# Patient Record
(demographics unavailable — no encounter records)

---

## 2017-02-06 NOTE — L&D FLOW SHEET
=================================================================

LD Flowsheet

=================================================================

Datetime Report Generated by CPN: 02/06/2017 22:00

   

   

=================================================================

Datetime: 02/06/2017 21:56

=================================================================

   

 Pulse:  118 (QS system process)

 SpO2 (%):  97 (QS system process)

 LaborFlag:  Antepartum (QS system process)

   

=================================================================

Datetime: 02/06/2017 21:54

=================================================================

   

 Pulse:  117 (QS system process)

 SpO2 (%):  94 (QS system process)

 LaborFlag:  Antepartum (QS system process)

   

=================================================================

Datetime: 02/06/2017 21:51

=================================================================

   

 Pulse:  116 (QS system process)

 SpO2 (%):  97 (QS system process)

 LaborFlag:  Antepartum (QS system process)

   

=================================================================

Datetime: 02/06/2017 21:50

=================================================================

   

 NBP Sys/Berta/Mean (mmHg):  123 (QS system process)

:  73 (QS system process)

:  89 (QS system process)

 Pulse:  115 (QS system process)

 LaborFlag:  Antepartum (QS system process)

   

=================================================================

Datetime: 02/06/2017 21:46

=================================================================

   

 Pulse:  114 (QS system process)

 SpO2 (%):  96 (QS system process)

 LaborFlag:  Antepartum (QS system process)

   

=================================================================

Datetime: 02/06/2017 21:44

=================================================================

   

 Pulse:  117 (QS system process)

 SpO2 (%):  94 (QS system process)

 LaborFlag:  Antepartum (QS system process)

   

=================================================================

Datetime: 02/06/2017 21:41

=================================================================

   

 Pulse:  103 (QS system process)

 SpO2 (%):  96 (QS system process)

 LaborFlag:  Antepartum (QS system process)

   

=================================================================

Datetime: 02/06/2017 21:37

=================================================================

   

 NBP Sys/Berta/Mean (mmHg):  125 (QS system process)

:  60 (QS system process)

:  87 (QS system process)

 Pulse:  100 (QS system process)

 Patient Care Comments:  Amnisure collected and sent to lab (Bobbi Irwin RN)

 LaborFlag:  Antepartum (QS system process)

   

=================================================================

Datetime: 02/06/2017 21:36

=================================================================

   

 Tocolytics:  Terbutaline 0.25mg Subcutaneous-Pulse Less than 120

   (Bobbi Irwin RN)

   

=================================================================

Datetime: 02/06/2017 21:30

=================================================================

   

Stage of Pregnancy:  Antepartum (Bobbi Irwin RN)

 Monitor Mode:  External (Bobbi Irwin RN)

 Frequency (min):  2-3 (Bobbi Irwin RN)

 Quality:  Mild/Moderate (Bobbi Irwin RN)

 Duration (sec):  50-90 (Bobbi Irwin RN)

 Resting Tone (Palpate):  Relaxed (Bobbi Irwin RN)

 Monitor Mode:  External US (Bobbi Irwin RN)

 FHR Baseline Rate :  135 (Bobbi Irwin RN)

 FHR Baseline Changes:  No Baseline Change (Bobbi Irwin RN)

 Variability:  Moderate 6-25 bpm (Bobbi Irwin RN)

 Accelerations:  15X15 (Bobbi Irwin RN)

 Decelerations:  None (Bobbi Irwin RN)

 Patient Position/Activity:  Right Tilt; Semi-Fowlers (Bobbi Irwin RN)

 Communication:  RN at Bedside; RN Reviewed Strip (Bobbi Irwin RN)

   

=================================================================

Datetime: 02/06/2017 21:20

=================================================================

   

 NBP Sys/Berta/Mean (mmHg):  117 (QS system process)

:  55 (QS system process)

:  79 (QS system process)

 Pulse:  94 (QS system process)

 LaborFlag:  Antepartum (QS system process)

   

=================================================================

Datetime: 02/06/2017 21:16

=================================================================

   

 Communication Comments:  Dr Mortensen at b/s to per form SVE and discuss

   POC with pt. Pt to receive one dose of Terbutaline, continue to

   monitor closely.  (Bobbi Irwin RN)

   

=================================================================

Datetime: 02/06/2017 21:15

=================================================================

   

 Dilatation (cm):  4.0 (Bobbi Irwin RN)

 Effacement (%):  50 (Bobbi Irwin RN)

 Station:  -3 (Bobbi Irwin RN)

 Exam by:  Dr Mortensen  (Bobbi Irwin RN)

 Cervix, Position:  Midposition (Bobbi Irwin RN)

 Fetal Presentation 'A':  Cephalic (Annotations: ballotable )

   (Bobbi Irwin RN)

 Membrane Comments:  BOW palpable (Bobbi Irwin RN)

   

=================================================================

Datetime: 02/06/2017 21:06

=================================================================

   

 NBP Sys/Berta/Mean (mmHg):  112 (QS system process)

:  54 (QS system process)

:  74 (QS system process)

 Pulse:  92 (QS system process)

 LaborFlag:  Antepartum (QS system process)

   

=================================================================

Datetime: 02/06/2017 21:00

=================================================================

   

Stage of Pregnancy:  Antepartum (Bobbi Irwin RN)

 Respirations:  16 (Bobbi Irwin RN)

 Monitor Mode:  External (Bobbi Irwin RN)

 Frequency (min):  2-4 (Bobbi Irwin RN)

 Quality:  Mild/Moderate (Bobbi Irwni RN)

 Duration (sec):  40-90 (Bobbi Irwin RN)

 Resting Tone (Palpate):  Relaxed (Bobbi Irwin RN)

 Monitor Mode:  External US (Bobbi Irwin RN)

 FHR Baseline Rate :  145 (Bobbi Irwin RN)

 FHR Baseline Changes:  No Baseline Change (Bobbi Irwin RN)

 Variability:  Moderate 6-25 bpm (Bobbi Irwin RN)

 Accelerations:  15X15 (Bobbi Irwin RN)

 Decelerations:  None (Bobbi Irwin RN)

 Pain Scale:  3 (Bobbi Irwin RN)

 Pain Presence:  Intermittent (Bobbi Irwin RN)

 Pain Type:  Contraction (Bobbi Irwin RN)

 Pain Location:  Abdomen (Bobbi Irwin RN)

 Pain Goal:  1 (Bobbi Irwin RN)

 Pain Relief Measures:  Comfort Measures (Bobbi Irwin RN)

 Pain Coping:  Talking Through Contractions (Bobbi Irwin RN)

 Pain Assessment Comments:  Dr Mortensen made aware  (Bobbi Irwin RN)

 Level of Consciousness:  Fully Conscious (Bobbi Irwin RN)

 DTR's/Clonus:  DTRs 2+; No Clonus (Bobbi Irwin RN)

 Headache:  Denies (Bobbi Irwin RN)

 Breath Sounds, Left:  Clear and Equal (Bobbi Irwin RN)

 Breath Sounds, Right:  Clear and Equal (Bobbi Irwin RN)

 Nausea/Vomiting:  Denies (Bobbi Irwin RN)

 RUQ Epigastric Pain:  Denies (Bobbi Irwin RN)

 Magnesium/Antihypertensives:  Magnesium Sulfate IV (Gm/hr) @ 2

   (Bobbi Irwin RN)

 Patient Position/Activity:  Right Tilt; Semi-Fowlers (Bobbi Irwin RN)

 Comfort Measures:  Breathing/Relaxation; Coaching; Family Support

   (Bobbi Irwin RN)

 Communication:  RN at Bedside; RN Reviewed Strip (Bobbi Irwin RN)

 LaborFlag:  Antepartum (QS system process)

   

=================================================================

Datetime: 02/06/2017 20:51

=================================================================

   

 NBP Sys/Berta/Mean (mmHg):  108 (QS system process)

:  54 (QS system process)

:  77 (QS system process)

 Pulse:  88 (QS system process)

 LaborFlag:  Antepartum (QS system process)

   

=================================================================

Datetime: 02/06/2017 20:36

=================================================================

   

 NBP Sys/Berta/Mean (mmHg):  112 (QS system process)

:  55 (QS system process)

:  77 (QS system process)

 Pulse:  88 (QS system process)

 LaborFlag:  Antepartum (QS system process)

   

=================================================================

Datetime: 02/06/2017 20:30

=================================================================

   

Stage of Pregnancy:  Antepartum (Bobbi Irwin RN)

 Monitor Mode:  External (Bobbi Irwin RN)

 Frequency (min):  2-4 (Bobbi Irwin RN)

 Quality:  Mild/Moderate (Bobbi Irwin RN)

 Duration (sec):  50-90 (Bobbi Irwin RN)

 Resting Tone (Palpate):  Relaxed (Bobbi Irwin RN)

 Monitor Mode:  External US (Bobbi Irwin RN)

 Monitor Interventions for FHR:  Ultrasound Adjusted (Bobbi Irwin RN)

 FHR Baseline Rate :  145 (Bobbi Irwin RN)

 FHR Baseline Changes:  No Baseline Change (Bobbi Irwin RN)

 Variability:  Moderate 6-25 bpm (Bobbi Irwin RN)

 Accelerations:  Prolonged (Bobbi Irwin RN)

 Decelerations:  None (Bobbi Irwin RN)

 Pain Presence:  Intermittent (Bobbi Irwin RN)

 Pain Type:  Contraction (Bobbi Irwin RN)

 Pain Location:  Abdomen (Bobbi Irwin RN)

 Pain Relief Measures:  Comfort Measures (Bobbi Irwin RN)

 Pain Coping:  Talking Through Contractions (Bobbi Irwin RN)

 Patient Position/Activity:  Right Tilt; Semi-Fowlers (Bobbi Irwin RN)

 Comfort Measures:  Breathing/Relaxation; Coaching; Family Support

   (Bobbi Irwin RN)

 Communication:  RN at Bedside; RN Reviewed Strip (Bobbi

   Errichiello, RN)

 LaborFlag:  Antepartum (QS system process)

   

=================================================================

Datetime: 02/06/2017 20:25

=================================================================

   

 NBP Sys/Berta/Mean (mmHg):  117 (QS system process)

:  60 (QS system process)

:  82 (QS system process)

 Pulse:  101 (QS system process)

 LaborFlag:  Antepartum (QS system process)

   

=================================================================

Datetime: 02/06/2017 20:15

=================================================================

   

 IV/Blood Work:  IV Started (Bobbi Errichiello, RN)

   

=================================================================

Datetime: 02/06/2017 20:10

=================================================================

   

 I/O Interventions:  Carlson Cath Inserted (Bobbi Irwin RN)

   

=================================================================

Datetime: 02/06/2017 20:05

=================================================================

   

 NBP Sys/Berta/Mean (mmHg):  131 (QS system process)

:  61 (QS system process)

:  88 (QS system process)

 Pulse:  97 (QS system process)

 LaborFlag:  Antepartum (QS system process)

   

=================================================================

Datetime: 02/06/2017 20:00

=================================================================

   

Stage of Pregnancy:  Antepartum (Bobbi Irwin RN)

 Respirations:  16 (Bobbi Irwin RN)

 Temperature (F):  98.2 (Bobbi Irwin RN)

 Temperature (C):  36.8 (QS system process)

 Monitor Mode:  External (Bobbi Irwin RN)

 Monitor Interventions for UA:  [place] Adjusted (Bobbi Irwin RN)

 Frequency (min):  2-4 (Bobbi Irwin RN)

 Quality:  Mild/Moderate (Bobbi Irwin RN)

 Duration (sec):  60-80 (Bobbi Irwin RN)

 Resting Tone (Palpate):  Relaxed (Bobbi Irwin RN)

 Monitor Mode:  External US (Bobbi Irwin RN)

 Monitor Interventions for FHR:  Ultrasound Adjusted (Bobbi Irwin RN)

 FHR Baseline Rate :  135 (Bobbi Irwin RN)

 FHR Baseline Changes:  No Baseline Change (Bobbi Irwin RN)

 Variability:  Moderate 6-25 bpm (Bobbi Irwin RN)

 Accelerations:  15X15 (Bobbi Irwin RN)

 Decelerations:  None (Bobbi Irwin RN)

 Pain Scale:  3 (Bobbi Irwin RN)

 Pain Presence:  Intermittent (Bobbi Irwin RN)

 Pain Type:  Contraction (Bobbi Irwin RN)

 Pain Location:  Abdomen (Bobbi Irwin RN)

 Pain Goal:  1 (Bobbi Irwin RN)

 Pain Relief Measures:  Comfort Measures (Bobbi Irwin RN)

 Pain Coping:  Talking Through Contractions (Bobbi Irwin RN)

 Pain Assessment Comments:  with UCs (Bobbi Irwin RN)

 Membrane Status:  Intact (Bobbi Irwin RN)

 Level of Consciousness:  Fully Conscious (Bobbi Irwin RN)

 DTR's/Clonus:  DTRs 2+; No Clonus (Bobbi Irwin RN)

 Headache:  Denies (Bobbi Irwin RN)

 Breath Sounds, Left:  Clear and Equal (Bobbi Irwin RN)

 Breath Sounds, Right:  Clear and Equal (Bobbi Irwin RN)

 Nausea/Vomiting:  Denies (Bobbi Irwin RN)

 RUQ Epigastric Pain:  Denies (Bobbi Irwin RN)

 Magnesium/Antihypertensives:  Magnesium Sulfate IV (Gm/hr) @ 2

   (Bobbi Irwin RN)

 Patient Position/Activity:  Right Tilt; Semi-Fowlers (Bobbi Irwin RN)

 Comfort Measures:  Breathing/Relaxation; Coaching; Family Support

   (Bobbi Irwin RN)

 Patient Care Comments:  Knee length LITTLE stockings/SCD applied

   (Bobbi Irwin RN)

 Communication:  RN at Bedside; RN Reviewed Strip (Bobbi Irwin RN)

 LaborFlag:  Antepartum (QS system process)

## 2017-02-06 NOTE — L&D FLOW SHEET
=================================================================

LD Flowsheet

=================================================================

Datetime Report Generated by CPN: 02/06/2017 20:00

   

   

=================================================================

Datetime: 02/06/2017 19:50

=================================================================

   

 NBP Sys/Berta/Mean (mmHg):  119 (QS system process)

:  58 (QS system process)

:  84 (QS system process)

 Pulse:  98 (QS system process)

   

=================================================================

Datetime: 02/06/2017 19:35

=================================================================

   

 NBP Sys/Berta/Mean (mmHg):  126 (QS system process)

:  60 (QS system process)

:  86 (QS system process)

 Pulse:  93 (QS system process)

   

=================================================================

Datetime: 02/06/2017 19:30

=================================================================

   

 Communication Comments:  report given to K. Fore, RN. Care

   relinquished (Maris Tovar, RN)

   

=================================================================

Datetime: 02/06/2017 19:22

=================================================================

   

 NBP Sys/Berta/Mean (mmHg):  124 (QS system process)

:  58 (QS system process)

:  84 (QS system process)

 Pulse:  93 (QS system process)

   

=================================================================

Datetime: 02/06/2017 19:15

=================================================================

   

 Monitor Mode:  External (Maris Tovar RN)

 Frequency (min):  3-4 (Maris Tovar RN)

 Quality:  Mild (Maris Tovar RN)

 Duration (sec):  50-60 (Maris Tovar RN)

 Duration Criteria:  Less than Two 120 Second Contractions (Maris Tovar RN)

 Pattern:  Normal: <= 5 Contractions in 10 Minutes (Maris Tovar RN)

 Resting Tone (Palpate):  Relaxed (Maris Tovar RN)

 Monitor Mode:  External US (Maris Tovar RN)

 FHR Baseline Rate :  140 (Maris Tovar RN)

 FHR Baseline Changes:  No Baseline Change (Maris Tovar RN)

 Variability:  Moderate 6-25 bpm (Maris Tovar RN)

 Accelerations:  15X15 (Maris Tovar RN)

 Decelerations:  None (Maris Tovar RN)

 Level of Consciousness:  Fully Conscious (Maris Tovar RN)

 DTR's/Clonus:  DTRs 2+; No Clonus (Maris Tovar RN)

 Headache:  Denies (Maris Tovar RN)

 Breath Sounds, Left:  Clear and Equal (Maris Tovar RN)

 Breath Sounds, Right:  Clear and Equal (Maris Tovar RN)

 Nausea/Vomiting:  Denies (Maris Tovar RN)

 RUQ Epigastric Pain:  Denies (Maris Tovar, VIDYA)

   

=================================================================

Datetime: 02/06/2017 19:06

=================================================================

   

 NBP Sys/Berta/Mean (mmHg):  125 (QS system process)

:  58 (QS system process)

:  83 (QS system process)

 Pulse:  99 (QS system process)

   

=================================================================

Datetime: 02/06/2017 19:00

=================================================================

   

 Monitor Mode:  External (Antonia Sinclair RN)

 Frequency (min):  2-4 (Antonia Sinclair RN)

 Quality:  Mild (Antonia Sinclair, RN)

 Duration (sec):  40-50 (Antonia Sinclair, RN)

 Resting Tone (Palpate):  Relaxed (Antonia Sinclair RN)

 Monitor Mode:  External US (Antonia Sinclair RN)

 FHR Baseline Rate :  135 (Antonia Sinclair, RN)

 Variability:  Moderate 6-25 bpm (Antonia Sinclair, RN)

 Accelerations:  15X15 (Antonia Sinclair, RN)

 Decelerations:  None (Antonia Sinclair, RN)

   

=================================================================

Datetime: 02/06/2017 18:47

=================================================================

   

 NBP Sys/Berta/Mean (mmHg):  111 (QS system process)

:  57 (QS system process)

:  80 (QS system process)

 Pulse:  87 (QS system process)

 Magnesium/Antihypertensives:  Magnesium Sulfate IV (Gm/hr) @ 2 (Maris Tovar RN)

   

=================================================================

Datetime: 02/06/2017 18:46

=================================================================

   

 Communication Comments:  order received from Dr. Mortensen for patient

   to use bedpan for I_O every hour due to patient not wanting hernandez

   catheter (Maris Tovar, RN)

   

=================================================================

Datetime: 02/06/2017 18:45

=================================================================

   

 Monitor Mode:  External (Antonia Yahir, RN)

 Frequency (min):  2-4 (Antoniaanette Sinclair, RN)

 Quality:  Mild (Antoniaanette Sinclair, RN)

 Duration (sec):  30-50 (Antoniaanette Sinclair, RN)

 Resting Tone (Palpate):  Relaxed (Antonia Sinclair, RN)

 Monitor Mode:  External US (Antonia Sniclair, RN)

 FHR Baseline Rate :  135 (Antoniaanette Sinclair, RN)

 Variability:  Moderate 6-25 bpm (Antonia Sinclair, RN)

 Accelerations:  15X15 (Antoniaanette Sinclair, RN)

 Decelerations:  None (Antonia Yahir, RN)

   

=================================================================

Datetime: 02/06/2017 18:42

=================================================================

   

 NBP Sys/Berta/Mean (mmHg):  115 (QS system process)

:  57 (QS system process)

:  78 (QS system process)

 Pulse:  88 (QS system process)

   

=================================================================

Datetime: 02/06/2017 18:37

=================================================================

   

 NBP Sys/Berta/Mean (mmHg):  118 (QS system process)

:  62 (QS system process)

:  81 (QS system process)

 Pulse:  90 (QS system process)

   

=================================================================

Datetime: 02/06/2017 18:33

=================================================================

   

 NBP Sys/Berta/Mean (mmHg):  113 (QS system process)

:  63 (QS system process)

:  79 (QS system process)

 Pulse:  100 (QS system process)

   

=================================================================

Datetime: 02/06/2017 18:32

=================================================================

   

 NBP Sys/Berta/Mean (mmHg):  123 (QS system process)

:  57 (QS system process)

:  82 (QS system process)

 Pulse:  94 (QS system process)

   

=================================================================

Datetime: 02/06/2017 18:30

=================================================================

   

 Monitor Mode:  External (Antonia Sinclair RN)

 Frequency (min):  2-4 (Antonia Sinclair RN)

 Quality:  Mild (Antonia Sinclair RN)

 Duration (sec):  30-50 (Antonia Sinclair RN)

 Resting Tone (Palpate):  Relaxed (Antonia Sinclair RN)

 Monitor Mode:  External US (Antonia Sinclair RN)

 FHR Baseline Rate :  135 (Antonia Sinclair RN)

 Variability:  Moderate 6-25 bpm (Antonia Sinclair RN)

 Accelerations:  10X10 (Antonia Sinclair RN)

 Decelerations:  None (Antonia Sinclair RN)

 Level of Consciousness:  Fully Conscious (Maris Tovar RN)

 DTR's/Clonus:  DTRs 2+; No Clonus (Maris Tovar RN)

 Headache:  Denies (Maris Tovar RN)

 Breath Sounds, Left:  Clear and Equal (Maris Tovar RN)

 Breath Sounds, Right:  Clear and Equal (Maris Tovar RN)

 Nausea/Vomiting:  Denies (Maris Tovar RN)

 RUQ Epigastric Pain:  Denies (Maris Tovar RN)

   

=================================================================

Datetime: 02/06/2017 18:28

=================================================================

   

 NBP Sys/Berta/Mean (mmHg):  127 (QS system process)

:  59 (QS system process)

:  85 (QS system process)

 Pulse:  88 (QS system process)

   

=================================================================

Datetime: 02/06/2017 18:22

=================================================================

   

 NBP Sys/Berta/Mean (mmHg):  126 (QS system process)

:  63 (QS system process)

:  88 (QS system process)

 Pulse:  90 (QS system process)

   

=================================================================

Datetime: 02/06/2017 18:15

=================================================================

   

 Monitor Mode:  External (Antonia Sinclair RN)

 Frequency (min):  2-4 (Antonia Sinclair, RN)

 Quality:  Mild (Antonia Sinclair RN)

 Duration (sec):  30-50 (Antonia Sinclair RN)

 Resting Tone (Palpate):  Relaxed (Antonia Sinclair RN)

 Monitor Mode:  External US (Antonia Sinclair RN)

 FHR Baseline Rate :  135 (Antonia Sinclair RN)

 Variability:  Moderate 6-25 bpm (Antonia Sinclair RN)

 Accelerations:  15X15 (Antonia Sinclair RN)

 Decelerations:  None (Antonia Sinclair RN)

 Level of Consciousness:  Fully Conscious (Maris Tovar RN)

 DTR's/Clonus:  DTRs 2+; No Clonus (Maris Tovar RN)

 Headache:  Denies (Maris Tovar RN)

 Breath Sounds, Left:  Clear and Equal (Maris Tovar RN)

 Breath Sounds, Right:  Clear and Equal (Maris Tovar RN)

 Nausea/Vomiting:  Denies (Maris Tovar RN)

 RUQ Epigastric Pain:  Denies (Maris Tovar RN)

 Magnesium/Antihypertensives:  Magnesium Sulfate IV Loading (Gm) @ 4

   (Maris Tovar RN)

   

=================================================================

Datetime: 02/06/2017 18:14

=================================================================

   

 Steroids:  Dexamethasone (mg) @ 12 (Maris Tovar RN)

 Medication Comments:  right gluteus IM (Maris Tovar RN)

   

=================================================================

Datetime: 02/06/2017 18:10

=================================================================

   

 Antibiotics:  Penicillin IV (Units) @ [id number] (Maris Tovar RN)

   

=================================================================

Datetime: 02/06/2017 18:00

=================================================================

   

 Monitor Mode:  External (Antonia Sinclair RN)

 Frequency (min):  2-4 (Antonia Sinclair RN)

 Quality:  Mild (Antonia Sinclair RN)

 Duration (sec):  30-60 (Antonia Sinclair RN)

 Resting Tone (Palpate):  Relaxed (Antonia Sinclair RN)

 Monitor Mode:  External US (Antonia Sinclair RN)

 FHR Baseline Rate :  135 (Antonia Sinclair RN)

 Variability:  Moderate 6-25 bpm (Antonia Sinclair RN)

 Accelerations:  10X10 (Antonia Sinclair RN)

 Decelerations:  None (Antonia Sinclair RN)

## 2017-02-06 NOTE — L&D PROGRESS NOTES
=================================================================

PROGRESS NOTES

=================================================================

Datetime Report Generated by CPN: 2017 21:28

   

   

=================================================================

PROGRESS NOTE

=================================================================

   

Impression:  Normal Progression of Labor

Procedures:  Artificial ROM

Plan:  Continue Present Management; Tocolysis

Informed Consent Obtained:  Vaginal Delivery;  Section

   Delivery; Risks, Benefits and Alternatives Discussed

Informed Consent Obtained:  Vaginal Delivery; Risks, Benefits and

   Alternatives Discussed

Vital Signs :  Reviewed

Comment:  32+0ega here for  labor.  Cervix on exam slightly more

   dilated than my previous exam.  Will give one dose of terbutaline to

   see if it will help lessen the ctx.  She is now reporting the ctx

   are more painful and baby is ballottable.   Will continue to

   monitor.  Deliver for unstoppable  labor.  

   

=================================================================

VAGINAL EXAM

=================================================================

   

Dilatation:  4

Dilatation:  4

Effacement:  50

Effacement:  50

Station:  -3

Station:  -3

Contractions:  q 2-3

Contractions:  q 2-3 minutes

   

=================================================================

MEMBRANES

=================================================================

   

Membranes:  Intact

   

=================================================================

FETUS A

=================================================================

   

FHR - Baseline:  135

Monitoring:  External US

Variability:  Moderate 6-25bpm

Accelerations:  15X15

Decelerations:  None

FHR Category:  Category I

Fetal Presentation:  Vertex

   

=================================================================

SIGNATURE

=================================================================

   

SIGNATURE:  [id number]

Signature:  Electronically signed by Eugenie Mortensen MD ([facility]) on

   2017 at 21:27  with User ID: [email or web address]

## 2017-02-06 NOTE — L&D FLOW SHEET
=================================================================

LD Flowsheet

=================================================================

Datetime Report Generated by CPN: 02/06/2017 18:00

   

   

=================================================================

Datetime: 02/06/2017 17:49

=================================================================

   

 NBP Sys/Berta/Mean (mmHg):  131 (QS system process)

:  68 (QS system process)

:  92 (QS system process)

 Pulse:  87 (QS system process)

   

=================================================================

Datetime: 02/06/2017 17:39

=================================================================

   

 Communication Comments:  orders received from Dr. Mortensen for

   Penicillin 5,000,000 units now, then Penicillin 2,500,000 units

   every 4 hours, Betamethasone 12 mg IV x1 now and Betamethasone 12 mg

   IV 24 hours after second dose, admit patient and do admission labs,

   Start magnesium 4 gram loading dose bolus, then Magnesium 2

   gram/hour continuously (Maris Tovar RN)

   

=================================================================

Datetime: 02/06/2017 17:37

=================================================================

   

 Dilatation (cm):  3.5 (Maris Tovar RN)

 Effacement (%):  50 (Maris Tovar RN)

 Station:  -3 (Maris Tovar RN)

 Exam by:  Dr. Mortensen (Maris Tovar RN)

 Cervix, Consistency:  Moderate (Maris Tovar RN)

 Cervix, Position:  Posterior (Maris Tovar RN)

   

=================================================================

Datetime: 02/06/2017 17:36

=================================================================

   

 Communication:  Provider at Bedside (Maris Tovar RN)

 Communication Comments:  Dr. Mortensen at patient's bedside assessing

   patient (Maris Tovar RN)

   

=================================================================

Datetime: 02/06/2017 17:33

=================================================================

   

 NBP Sys/Berta/Mean (mmHg):  109 (QS system process)

:  62 (QS system process)

:  79 (QS system process)

 Pulse:  86 (QS system process)

   

=================================================================

Datetime: 02/06/2017 17:31

=================================================================

   

 Communication Comments:  blood glucose 75 (Maris Guy, RN)

   

=================================================================

Datetime: 02/06/2017 17:27

=================================================================

   

 Communication Comments:  order received from PEllen Boyd, CNM for blood

   sugar check (Maris Guy, RN)

   

=================================================================

Datetime: 02/06/2017 17:26

=================================================================

   

 Bedside Blood Glucose:  75 (QS system process)

   

=================================================================

Datetime: 02/06/2017 17:22

=================================================================

   

 Comments:  P. Boyd CNM notified of patient's cervical length and UA

   results. Provider reviewing strip. No new orders received (Maris Tovar, RN)

   

=================================================================

Datetime: 02/06/2017 17:18

=================================================================

   

 NBP Sys/Berta/Mean (mmHg):  111 (QS system process)

:  57 (QS system process)

:  79 (QS system process)

 Pulse:  77 (QS system process)

   

=================================================================

Datetime: 02/06/2017 17:03

=================================================================

   

 NBP Sys/Berta/Mean (mmHg):  113 (QS system process)

:  70 (QS system process)

:  84 (QS system process)

 Pulse:  91 (QS system process)

   

=================================================================

Datetime: 02/06/2017 17:00

=================================================================

   

 Monitor Mode:  External; Palpation (Maris Tovar, VIDYA)

 Frequency (min):  x1 (Maris Tovar, VIDYA)

 Quality:  Mild (Maris Tovar, VIDYA)

 Duration (sec):  40 (Maris Tovar, VIDYA)

 Duration Criteria:  Less than Two 120 Second Contractions (Maris Tovar, RN)

 Pattern:  Normal: <= 5 Contractions in 10 Minutes (Maris Tovar, RN)

 Resting Tone (Palpate):  Relaxed (Maris Tovar, RN)

 Monitor Mode:  External US (Maris Tovar, RN)

 FHR Baseline Rate :  140 (Maris Tovar RN)

 FHR Baseline Changes:  No Baseline Change (Maris Tovar, VIDYA)

 Variability:  Moderate 6-25 bpm (Maris Tovar, RN)

 Accelerations:  None (Maris Tovar, RN)

 Decelerations:  None (Maris Tovar, RN)

   

=================================================================

Datetime: 02/06/2017 16:49

=================================================================

   

 NBP Sys/Berta/Mean (mmHg):  115 (QS system process)

:  62 (QS system process)

:  83 (QS system process)

 Pulse:  83 (QS system process)

   

=================================================================

Datetime: 02/06/2017 16:46

=================================================================

   

 Monitor Interventions for UA:  [place] Adjusted (Maris Guy, RN)

   

=================================================================

Datetime: 02/06/2017 16:42

=================================================================

   

 Monitor Interventions for FHR:  Ultrasound Adjusted (Maris Tovar,

   RN)

 Patient Position/Activity:  Right Tilt; Semi-Fowlers (Maris Guy,

   RN)

   

=================================================================

Datetime: 02/06/2017 16:18

=================================================================

   

 Communication Comments:  monitors removed from patient for patient's

   transport to [place] (Maris Tovar RN)

   

=================================================================

Datetime: 02/06/2017 16:11

=================================================================

   

 NBP Sys/Berta/Mean (mmHg):  117 (QS system process)

:  56 (QS system process)

:  79 (QS system process)

 Pulse:  83 (QS system process)

   

=================================================================

Datetime: 02/06/2017 16:00

=================================================================

   

 Monitor Mode:  External; Palpation (Maris Tovar RN)

 Frequency (min):  2.5-4 (Maris Tovar RN)

 Quality:  Mild (Maris Tovar RN)

 Duration (sec):  50-60 (Maris Tovar RN)

 Duration Criteria:  Less than Two 120 Second Contractions (Maris Tovar RN)

 Pattern:  Normal: <= 5 Contractions in 10 Minutes (Maris Tovar RN)

 Resting Tone (Palpate):  Relaxed (Maris Tovar RN)

 Monitor Mode:  External US (Maris Tovar RN)

 FHR Baseline Rate :  140 (Maris Tovar RN)

 FHR Baseline Changes:  No Baseline Change (Maris Tovar RN)

 Variability:  Moderate 6-25 bpm (Maris Tovar RN)

 Accelerations:  10X10 (Maris Tovar RN)

 Decelerations:  None (Maris Tovar RN)

## 2017-02-06 NOTE — L&D FLOW SHEET
=================================================================

LD Flowsheet

=================================================================

Datetime Report Generated by CPN: 02/06/2017 16:00

   

   

=================================================================

Datetime: 02/06/2017 15:56

=================================================================

   

   

=================================================================

Vital Signs

=================================================================

   

 NBP Sys/Berta/Mean (mmHg):  126 (QS system process)

:  61 (QS system process)

:  85 (QS system process)

 Pulse:  83 (QS system process)

   

=================================================================

Datetime: 02/06/2017 15:41

=================================================================

   

   

=================================================================

Vital Signs

=================================================================

   

 NBP Sys/Berta/Mean (mmHg):  112 (QS system process)

:  62 (QS system process)

:  81 (QS system process)

 Pulse:  92 (QS system process)

   

=================================================================

Datetime: 02/06/2017 15:29

=================================================================

   

   

=================================================================

Vital Signs

=================================================================

   

 NBP Sys/Berta/Mean (mmHg):  109 (QS system process)

:  63 (QS system process)

:  78 (QS system process)

 Pulse:  96 (QS system process)

   

=================================================================

Datetime: 02/06/2017 15:26

=================================================================

   

   

=================================================================

Patient Care

=================================================================

   

 IV/Blood Work:  IV Started; IV Bolus Started (Maris Guy, RN)

   

=================================================================

Datetime: 02/06/2017 15:18

=================================================================

   

   

=================================================================

Uterine Activity

=================================================================

   

 Monitor Interventions for UA:  [place] Adjusted (Maris Tovar, RN)

 Resting Tone (Palpate):  Relaxed (Maris Tovar, RN)

   

=================================================================

Fetal Assessment A

=================================================================

   

 Monitor Mode:  External US (Maris Tovar, RN)

   

=================================================================

Pain

=================================================================

   

 Pain Scale:  2 (Annotations: patient states she has no pain except for

   during contractions, then pain is 2/5. Patient states she does not

   know how far apart contractions are) (Maris Tovar RN)

 Pain Presence:  Intermittent (Maris Tovar RN)

 Pain Type:  Cramping (Maris Tovar RN)

 Pain Location:  Back (Maris Tovar RN)

 Pain Goal:  1 (Maris Tovar RN)

 Pain Relief Measures:  Comfort Measures (Maris Tovar RN)

   

=================================================================

Vaginal Exam

=================================================================

   

 Membrane Status:  Intact (Annotations: intact per patient) (Maris Tovar RN)

 Vaginal Bleeding:  Scant (Annotations: patient states she has had

   bright red spotting since 2030 2-5-17) (Maris Tovar RN)

   

=================================================================

Maternal Assessment

=================================================================

   

 Level of Consciousness:  Fully Conscious (Maris Tovar RN)

 DTR's/Clonus:  DTRs 2+; No Clonus (Maris Tovar RN)

 Headache:  Denies (Maris Tovar RN)

 Breath Sounds, Left:  Clear and Equal (Maris Tovar RN)

 Breath Sounds, Right:  Clear and Equal (Maris Tovar RN)

 Nausea/Vomiting:  Denies (Maris Tovar RN)

 RUQ Epigastric Pain:  Denies (Maris Tovar RN)

 Oxygen Method:  Room Air (Maris Tovar RN)

 Patient Position/Activity:  Right Tilt; Semi-Fowlers (Maris Tovar RN)

 I/O Interventions:  Clear Liquids Given (Maris Tovar RN)

 Provider Reviewed Strip:  Yes (Maris Tovar RN)

   

=================================================================

Teaching

=================================================================

   

 Instructional Method:  Verbal (Maris Tovar RN)

 Plan of Care:  Plan of Care Discussed (Maris Tovar RN)

 Unit Routine:  [place] to Room; Call New; Bed; Visiting Policy;

   Waiting Areas (Maris Tovar RN)

 Pregnancy Related:  Common Discomforts of Pregnancy (Maris Tovar RN)

## 2017-02-07 NOTE — L&D PROGRESS NOTES
=================================================================

PROGRESS NOTES

=================================================================

Datetime Report Generated by CPN: 2017 18:55

   

   

=================================================================

PROGRESS NOTE

=================================================================

   

Impression:   Labor

Plan:  Continue Present Management

Comment:  cont steroids for flm, gbs prophylaxis and tocolysis

   

=================================================================

FETUS A

=================================================================

   

Monitoring:  External US

FHR Category:  Category I

   

=================================================================

FETUS C

=================================================================

   

SIGNATURE:  [id number]

Signature:  Electronically signed by MD TENISHA Roland) on

   2017 at 18:54  with User ID: [email or web address]

## 2017-02-07 NOTE — L&D FLOW SHEET
=================================================================

LD Flowsheet

=================================================================

Datetime Report Generated by CPN: 02/07/2017 10:00

   

   

=================================================================

Datetime: 02/07/2017 09:51

=================================================================

   

 NBP Sys/Berta/Mean (mmHg):  120 (QS system process)

:  70 (QS system process)

:  89 (QS system process)

 Pulse:  100 (QS system process)

 LaborFlag:  Antepartum (QS system process)

   

=================================================================

Datetime: 02/07/2017 09:45

=================================================================

   

 Monitor Mode:  External; Palpation (Maris Tovar RN)

 Frequency (min):  x2 (Maris Tovar RN)

 Quality:  Mild (Maris Tovar RN)

 Duration (sec):  60 (Maris Tovar RN)

 Duration Criteria:  Less than Two 120 Second Contractions (Maris Tovar RN)

 Pattern:  Normal: <= 5 Contractions in 10 Minutes (Maris Tovar RN)

 Resting Tone (Palpate):  Relaxed (Maris Tovar RN)

 Monitor Mode:  External US (Maris Tovar RN)

 FHR Baseline Rate :  130 (Maris Tovar RN)

 FHR Baseline Changes:  No Baseline Change (Maris Tovar RN)

 Variability:  Moderate 6-25 bpm (Maris Tovar RN)

 Accelerations:  None (Maris Tovar, RN)

 Decelerations:  None (Maris Tovar RN)

 Communication Comments:  no pooling, no visible leaking (Maris Tovar RN)

 Communication Comments:  amnisure negative (Maris Tovar RN)

   

=================================================================

Datetime: 02/07/2017 09:31

=================================================================

   

 Respirations:  14 (Maris Tovar RN)

 Temperature (F):  98.1 (Maris Tovar RN)

 Temperature (C):  36.7 (QS system process)

 Temperature Route:  Oral (Maris Tovar RN)

 LaborFlag:  Antepartum (QS system process)

   

=================================================================

Datetime: 02/07/2017 09:30

=================================================================

   

 Monitor Mode:  External; Palpation (Maris Tovar, RN)

 Frequency (min):  x2 (Maris Tovar, RN)

 Quality:  Mild (Maris Guy, RN)

 Duration (sec):  50-60 (Maris Guy, RN)

 Duration Criteria:  Less than Two 120 Second Contractions (Maris

   Guy, RN)

 Pattern:  Normal: <= 5 Contractions in 10 Minutes (Maris Tovar, RN)

 Resting Tone (Palpate):  Relaxed (Maris Tovar, RN)

 Monitor Mode:  External US (Maris Tovar, RN)

 FHR Baseline Rate :  130 (Maris Tovar, RN)

 FHR Baseline Changes:  No Baseline Change (Maris Tovar, RN)

 Variability:  Moderate 6-25 bpm (Maris Guy, RN)

 Accelerations:  15X15 (Maris Guy, RN)

 Decelerations:  None (Maris Guy, RN)

   

=================================================================

Datetime: 02/07/2017 09:27

=================================================================

   

 Patient Position/Activity:  Right Tilt (Maris Tovar, RN)

 Communication Comments:  patient complains of hernandez catheter leaking,

   no visual urine leaking, amnisure collected and sent (Maris Tovar, RN)

   

=================================================================

Datetime: 02/07/2017 09:21

=================================================================

   

 NBP Sys/Berta/Mean (mmHg):  116 (QS system process)

:  57 (QS system process)

:  78 (QS system process)

 Pulse:  95 (QS system process)

 LaborFlag:  Antepartum (QS system process)

   

=================================================================

Datetime: 02/07/2017 09:15

=================================================================

   

 Monitor Mode:  External; Palpation (Maris Tovar RN)

 Frequency (min):  2-8 (Maris Tovar, VIDYA)

 Quality:  Mild (Maris Tovar, RN)

 Duration (sec):   (Maris Tovar, RN)

 Duration Criteria:  Less than Two 120 Second Contractions (Maris Tovar, RN)

 Pattern:  Normal: <= 5 Contractions in 10 Minutes (Maris Tovar, RN)

 Resting Tone (Palpate):  Relaxed (Maris Tovar, RN)

 Monitor Mode:  External US (Maris Tovar, RN)

 FHR Baseline Rate :  125 (Maris Tovar, RN)

 FHR Baseline Changes:  No Baseline Change (Maris Tovar, RN)

 Variability:  Moderate 6-25 bpm (Maris Tovar, RN)

 Accelerations:  15X15 (Maris Tovar, RN)

 Decelerations:  None (Maris Tovar, RN)

   

=================================================================

Datetime: 02/07/2017 09:00

=================================================================

   

 Monitor Mode:  External; Palpation (Maris Tovar RN)

 Frequency (min):  5.5-7 (Maris Tovar RN)

 Quality:  Mild (Maris Tovar, RN)

 Duration (sec):  50-60 (Maris Tovar, VIDYA)

 Duration Criteria:  Less than Two 120 Second Contractions (Maris Tovar RN)

 Pattern:  Normal: <= 5 Contractions in 10 Minutes (Maris Tovar RN)

 Resting Tone (Palpate):  Relaxed (Maris Tovar, RN)

 Monitor Mode:  External US (Maris Tovar, RN)

 FHR Baseline Rate :  125 (Maris Tovar RN)

 FHR Baseline Changes:  No Baseline Change (Maris Tovar RN)

 Variability:  Moderate 6-25 bpm (Maris Tovar, RN)

 Accelerations:  15X15 (Maris Tovar, RN)

 Decelerations:  None (Maris Tovar RN)

 Level of Consciousness:  Fully Conscious (Maris Tovar, RN)

 DTR's/Clonus:  DTRs 2+; No Clonus (Maris Tovar, RN)

 Headache:  Denies (Maris Tovar, RN)

 Breath Sounds, Left:  Clear and Equal (Maris Tovar, RN)

 Breath Sounds, Right:  Clear and Equal (Maris Tovar, RN)

 Nausea/Vomiting:  Denies (Maris Tovar, RN)

 RUQ Epigastric Pain:  Denies (Maris Tovar, RN)

   

=================================================================

Datetime: 02/07/2017 08:51

=================================================================

   

 NBP Sys/Berta/Mean (mmHg):  101 (QS system process)

:  57 (QS system process)

:  72 (QS system process)

 Pulse:  97 (QS system process)

 LaborFlag:  Antepartum (QS system process)

   

=================================================================

Datetime: 02/07/2017 08:45

=================================================================

   

 Monitor Mode:  External; Palpation (Maris Tovar RN)

 Frequency (min):  x1 (Maris Tovar, VIDYA)

 Quality:  Mild (Maris Tovar, RN)

 Duration (sec):  80 (Maris Tovar, RN)

 Duration Criteria:  Less than Two 120 Second Contractions (Maris Tovar RN)

 Pattern:  Normal: <= 5 Contractions in 10 Minutes (Maris Tovar RN)

 Resting Tone (Palpate):  Relaxed (Maris Tovar, RN)

 Monitor Mode:  External US (Maris Tovar RN)

 FHR Baseline Changes:  No Baseline Change (Maris Tovar RN)

 Variability:  Moderate 6-25 bpm (Maris Tovar, RN)

 Accelerations:  10X10 (Maris Tovar, RN)

 Decelerations:  None (Maris Tovar, RN)

   

=================================================================

Datetime: 02/07/2017 08:43

=================================================================

   

 Antibiotics:  Penicillin IV (Units) @ 2,500,000 (Maris Tovar RN)

   

=================================================================

Datetime: 02/07/2017 08:30

=================================================================

   

 Monitor Mode:  External; Palpation (Maris Tovar, VIDYA)

 Frequency (min):  x2 (Maris Tovar, VIDYA)

 Quality:  Mild (Maris Tovar, VIDYA)

 Duration (sec):  60-70 (Maris Tovar, RN)

 Duration Criteria:  Less than Two 120 Second Contractions (Maris Tovar, RN)

 Pattern:  Normal: <= 5 Contractions in 10 Minutes (Maris Tovar, RN)

 Resting Tone (Palpate):  Relaxed (Maris Tovar, RN)

 Monitor Mode:  External US (Maris Tovar, RN)

 FHR Baseline Rate :  130 (Maris Tovar, RN)

 FHR Baseline Changes:  No Baseline Change (Maris Tovar, RN)

 Variability:  Moderate 6-25 bpm (Maris Tovar, RN)

 Accelerations:  10X10 (Maris Tovar, RN)

 Decelerations:  None (Maris Tovar, RN)

   

=================================================================

Datetime: 02/07/2017 08:21

=================================================================

   

 NBP Sys/Berta/Mean (mmHg):  107 (QS system process)

:  56 (QS system process)

:  77 (QS system process)

 Pulse:  83 (QS system process)

 LaborFlag:  Antepartum (QS system process)

   

=================================================================

Datetime: 02/07/2017 08:15

=================================================================

   

 Monitor Mode:  External; Palpation (Maris Guy, RN)

 Frequency (min):  x1 (Maris Guy, RN)

 Quality:  Mild (Maris Guy, RN)

 Duration (sec):  60 (Maris Guy, RN)

 Duration Criteria:  Less than Two 120 Second Contractions (Maris

   Guy, RN)

 Pattern:  Normal: <= 5 Contractions in 10 Minutes (Maris Guy, RN)

 Resting Tone (Palpate):  Relaxed (Maris Guy, RN)

 Monitor Mode:  External US (Maris Guy, RN)

 FHR Baseline Rate :  130 (Maris Guy, RN)

 FHR Baseline Changes:  No Baseline Change (Maris Guy, RN)

 Variability:  Moderate 6-25 bpm (Maris Guy, RN)

 Accelerations:  10X10 (Maris Guy, RN)

 Decelerations:  None (Maris Guy, RN)

   

=================================================================

Datetime: 02/07/2017 08:00

=================================================================

   

 Monitor Mode:  External; Palpation (Maris Guy, RN)

 Frequency (min):  x1 (Maris Guy, RN)

 Quality:  Mild (Maris Guy, RN)

 Duration (sec):  60 (Maris Guy, RN)

 Duration Criteria:  Less than Two 120 Second Contractions (Maris Tovar RN)

 Pattern:  Normal: <= 5 Contractions in 10 Minutes (Maris Tovar RN)

 Resting Tone (Palpate):  Relaxed (Maris Tovar RN)

 Monitor Mode:  External US (Maris Tovar RN)

 FHR Baseline Rate :  130 (Maris Tovar RN)

 FHR Baseline Changes:  No Baseline Change (Maris Tovar RN)

 Variability:  Moderate 6-25 bpm (Maris Tovar RN)

 Accelerations:  10X10 (Maris Tovar RN)

 Decelerations:  None (Maris Tovar RN)

 Level of Consciousness:  Fully Conscious (Maris Tovar RN)

 DTR's/Clonus:  DTRs 2+; No Clonus (Maris Tovar RN)

 Headache:  Denies (Marsi Tovar RN)

 Breath Sounds, Left:  Clear and Equal (Maris Tovar RN)

 Breath Sounds, Right:  Clear and Equal (Maris Tovar RN)

 Nausea/Vomiting:  Denies (Maris Tovar RN)

 RUQ Epigastric Pain:  Denies (Maris Tovar RN)

## 2017-02-07 NOTE — L&D FLOW SHEET
=================================================================

LD Flowsheet

=================================================================

Datetime Report Generated by CPN: 02/07/2017 22:00

   

   

=================================================================

Datetime: 02/07/2017 20:59

=================================================================

   

 NBP Sys/Berta/Mean (mmHg):  111 (QS system process)

:  61 (QS system process)

:  81 (QS system process)

 Pulse:  102 (QS system process)

 LaborFlag:  Antepartum (QS system process)

   

=================================================================

Datetime: 02/07/2017 20:51

=================================================================

   

 NBP Sys/Berta/Mean (mmHg):  108 (QS system process)

:  58 (QS system process)

:  78 (QS system process)

 Pulse:  94 (QS system process)

 LaborFlag:  Antepartum (QS system process)

   

=================================================================

Datetime: 02/07/2017 20:21

=================================================================

   

 NBP Sys/Berta/Mean (mmHg):  108 (QS system process)

:  53 (QS system process)

:  76 (QS system process)

 Pulse:  107 (QS system process)

 LaborFlag:  Antepartum (QS system process)

   

=================================================================

Datetime: 02/07/2017 20:00

=================================================================

   

 Pain Presence:  None/Denies (Rere Fox, RN)

 Level of Consciousness:  Fully Conscious (Rere Fox, RN)

 DTR's/Clonus:  DTRs 2+; No Clonus (Rere Fox, RN)

 Headache:  Denies (Rere Fox RN)

 Breath Sounds, Left:  Clear and Equal (Rere Dominique, RN)

 Breath Sounds, Right:  Clear and Equal (Rere Dominique, RN)

 Nausea/Vomiting:  Denies (Rere Fox RN)

 RUQ Epigastric Pain:  Denies (Rere Fox RN)

 Magnesium/Antihypertensives:  Magnesium Sulfate IV (Gm/hr) @

   (Annotations: 2) (Rere Fox RN)

 Patient Position/Activity:  Left Lateral (Rere Fox RN)

 Comfort Measures:  Family Support (Rere Fox RN)

 Instructional Method:  Verbal; Patient Instructed; Family/Support

   Person Instructed; Verbalized Understanding (Rere Dominique, RN)

 Plan of Care:  Plan of Care Discussed (Rere Fox RN)

 Unit Routine:  Fetal Monitoring (Annotations: Pt instructed that

   intermittent fetal monitoring was ordered. Pt and support person

   understand she is to report any increase in ctx/ pain so that fetal

   monitoring can be restarted. ) (Rere Fox RN)

 LaborFlag:  Antepartum (QS system process)

## 2017-02-07 NOTE — L&D FLOW SHEET
=================================================================

LD Flowsheet

=================================================================

Datetime Report Generated by CPN: 02/07/2017 18:00

   

   

=================================================================

Datetime: 02/07/2017 17:51

=================================================================

   

 NBP Sys/Berta/Mean (mmHg):  117 (QS system process)

:  55 (QS system process)

:  75 (QS system process)

 Pulse:  98 (QS system process)

 LaborFlag:  Antepartum (QS system process)

   

=================================================================

Datetime: 02/07/2017 17:30

=================================================================

   

 Monitor Mode:  External; Palpation (Maris Tovar RN)

 Quality:  Mild (Maris Guy, RN)

 Duration Criteria:  Less than Two 120 Second Contractions (Maris Tovar, RN)

 Pattern:  Normal: <= 5 Contractions in 10 Minutes (Maris Tovar, RN)

 Resting Tone (Palpate):  Relaxed (Maris Tovar, RN)

 Contraction Comments:  irritability (Maris Tovar RN)

 Monitor Mode:  External US (Maris Tovar RN)

 FHR Baseline Rate :  130 (Maris Tovar, RN)

 FHR Baseline Changes:  No Baseline Change (Maris Tovar, RN)

 Variability:  Marked >25 bpm (Maris Tovar, RN)

 Accelerations:  15X15 (Maris Tovar, RN)

 Decelerations:  None (Maris Tovar, RN)

   

=================================================================

Datetime: 02/07/2017 17:21

=================================================================

   

 NBP Sys/Berta/Mean (mmHg):  113 (QS system process)

:  55 (QS system process)

:  74 (QS system process)

 Pulse:  96 (QS system process)

 LaborFlag:  Antepartum (QS system process)

   

=================================================================

Datetime: 02/07/2017 17:15

=================================================================

   

 Monitor Mode:  External; Palpation (Maris Tovar, VIDYA)

 Quality:  Mild (Maris Tovar RN)

 Duration Criteria:  Less than Two 120 Second Contractions (Maris Tovar RN)

 Pattern:  Normal: <= 5 Contractions in 10 Minutes (Maris Tovar, RN)

 Resting Tone (Palpate):  Relaxed (Maris Tovar RN)

 Contraction Comments:  irritability (Maris Tovar RN)

 Monitor Mode:  External US (Maris Tovar, RN)

 FHR Baseline Rate :  140 (Maris Tovar, RN)

 FHR Baseline Changes:  No Baseline Change (Maris Tovar, RN)

 Variability:  Moderate 6-25 bpm (Maris Tovar, RN)

 Accelerations:  15X15 (Maris Tovar, RN)

 Decelerations:  None (Maris Tovar, RN)

   

=================================================================

Datetime: 02/07/2017 17:00

=================================================================

   

 Monitor Mode:  External; Palpation (Maris Tovar RN)

 Quality:  Mild (Maris Tovar RN)

 Duration Criteria:  Less than Two 120 Second Contractions (Maris Tovar RN)

 Pattern:  Normal: <= 5 Contractions in 10 Minutes (Maris Tovar, RN)

 Resting Tone (Palpate):  Relaxed (Maris Tovar RN)

 Contraction Comments:  irritability (Maris Tovar RN)

 Monitor Mode:  External US (Maris Tovar RN)

 FHR Baseline Rate :  145 (Maris Tovar, RN)

 FHR Baseline Changes:  No Baseline Change (Maris Tovar, RN)

 Variability:  Moderate 6-25 bpm (Maris Tovar, RN)

 Accelerations:  15X15 (Maris Tovar, RN)

 Decelerations:  None (Maris Tovar, VIDYA)

 Magnesium/Antihypertensives:  Magnesium Sulfate IV (Gm/hr) @

   (Annotations: 2) (Maris Tovar, RN)

   

=================================================================

Datetime: 02/07/2017 16:51

=================================================================

   

 NBP Sys/Berta/Mean (mmHg):  108 (QS system process)

:  54 (QS system process)

:  78 (QS system process)

 Pulse:  87 (QS system process)

 LaborFlag:  Antepartum (QS system process)

   

=================================================================

Datetime: 02/07/2017 16:45

=================================================================

   

 Monitor Mode:  External; Palpation (Maris Tovar RN)

 Quality:  Mild/Moderate (Maris Tovar RN)

 Duration Criteria:  Less than Two 120 Second Contractions (Maris Tovar RN)

 Pattern:  Normal: <= 5 Contractions in 10 Minutes (Maris Tovar RN)

 Resting Tone (Palpate):  Relaxed (Maris Tovar RN)

 Contraction Comments:  irritability (Maris Tovar RN)

 Monitor Mode:  External US (Mairs Tovar RN)

 FHR Baseline Rate :  145 (Maris Tovar RN)

 FHR Baseline Changes:  No Baseline Change (Maris Tovar RN)

 Variability:  Moderate 6-25 bpm (Maris Tovar RN)

 Accelerations:  15X15 (Maris Tovar RN)

 Decelerations:  None (Maris Tovar RN)

   

=================================================================

Datetime: 02/07/2017 16:30

=================================================================

   

 Monitor Mode:  External; Palpation (Maris Tovar RN)

 Quality:  Mild (Maris Tovar, RN)

 Duration Criteria:  Less than Two 120 Second Contractions (Maris Tovar, RN)

 Pattern:  Normal: <= 5 Contractions in 10 Minutes (Maris Tovar, RN)

 Resting Tone (Palpate):  Relaxed (Maris Tovar, RN)

 Contraction Comments:  irritability (Maris Tovar RN)

 Monitor Mode:  External US (Maris Tovar, RN)

 FHR Baseline Rate :  145 (Maris Tovar, RN)

 FHR Baseline Changes:  No Baseline Change (Maris Tovar, RN)

 Variability:  Moderate 6-25 bpm (Maris Tovar, RN)

 Accelerations:  10X10 (Maris Tovar, RN)

 Decelerations:  None (Maris Tovar, RN)

   

=================================================================

Datetime: 02/07/2017 16:21

=================================================================

   

 NBP Sys/Berta/Mean (mmHg):  118 (QS system process)

:  58 (QS system process)

:  83 (QS system process)

 Pulse:  100 (QS system process)

 LaborFlag:  Antepartum (QS system process)

   

=================================================================

Datetime: 02/07/2017 16:15

=================================================================

   

 Monitor Mode:  External; Palpation (Maris Tovar, RN)

 Frequency (min):  x1 (Maris Tovar, RN)

 Quality:  Mild (Maris Guy, RN)

 Duration (sec):  60 (Marislianne Tovar, RN)

 Duration Criteria:  Less than Two 120 Second Contractions (Maris Tovar, RN)

 Pattern:  Normal: <= 5 Contractions in 10 Minutes (Maris Tovar, RN)

 Resting Tone (Palpate):  Relaxed (Maris Tovar, RN)

 Monitor Mode:  External US (Maris Tovar, RN)

 FHR Baseline Rate :  140 (Maris Tovar, RN)

 FHR Baseline Changes:  No Baseline Change (Maris Tovar, RN)

 Variability:  Moderate 6-25 bpm (Maris Guy, RN)

 Accelerations:  15X15 (Maris Tovar, RN)

 Decelerations:  None (Maris Tovar, RN)

   

=================================================================

Datetime: 02/07/2017 16:00

=================================================================

   

 Monitor Mode:  External; Palpation (Maris Tovar, RN)

 Quality:  Mild (Maris Guy, RN)

 Resting Tone (Palpate):  Relaxed (Maris Guy, RN)

 Contraction Comments:  irritability (Maris Tovar, RN)

 Monitor Mode:  External US (Maris Tovar, RN)

 FHR Baseline Rate :  140 (Maris Tovar, RN)

 FHR Baseline Changes:  No Baseline Change (Maris Tovar, RN)

 Variability:  Moderate 6-25 bpm (Maris Tovar RN)

 Accelerations:  15X15 (Maris Tovar RN)

 Decelerations:  None (Maris Tovar RN)

 Magnesium/Antihypertensives:  Magnesium Sulfate IV (Gm/hr) @

   (Annotations: 2) (Maris Tovar RN)

## 2017-02-07 NOTE — L&D FLOW SHEET
=================================================================

LD Flowsheet

=================================================================

Datetime Report Generated by CPN: 02/07/2017 14:00

   

   

=================================================================

Datetime: 02/07/2017 13:51

=================================================================

   

 NBP Sys/Berta/Mean (mmHg):  107 (QS system process)

:  58 (QS system process)

:  78 (QS system process)

 Pulse:  102 (QS system process)

 LaborFlag:  Antepartum (QS system process)

   

=================================================================

Datetime: 02/07/2017 13:45

=================================================================

   

 Monitor Mode:  External; Palpation (Marsi Tovar RN)

 Frequency (min):  x1 (Maris Guy, RN)

 Quality:  Mild (Maris Tovar, RN)

 Duration (sec):  7 (Maris Tovar, RN)

 Duration Criteria:  Less than Two 120 Second Contractions (Maris Tovar, RN)

 Pattern:  Normal: <= 5 Contractions in 10 Minutes (Maris Tovar, RN)

 Resting Tone (Palpate):  Relaxed (Maris Tovar, RN)

 Monitor Mode:  External US (Maris Tovar, RN)

 FHR Baseline Rate :  130 (Maris Tovar, RN)

 FHR Baseline Changes:  No Baseline Change (Maris Tovar, RN)

 Variability:  Moderate 6-25 bpm (Maris Tovar, RN)

 Accelerations:  15X15 (Maris Tovra, RN)

 Decelerations:  None (Maris Tovar, RN)

   

=================================================================

Datetime: 02/07/2017 13:38

=================================================================

   

 Magnesium/Antihypertensives:  Magnesium Sulfate IV (Gm/hr) @

   (Annotations: 2) (Maris Tovar, RN)

   

=================================================================

Datetime: 02/07/2017 13:30

=================================================================

   

 Monitor Mode:  External; Palpation (Maris Tovar, RN)

 Quality:  Mild (Maris Tovar, RN)

 Duration Criteria:  Less than Two 120 Second Contractions (Maris Tovar, RN)

 Pattern:  Normal: <= 5 Contractions in 10 Minutes (Maris Toavr RN)

 Resting Tone (Palpate):  Relaxed (Maris Tovar RN)

 Contraction Comments:  irritability (Maris Tovar RN)

 Monitor Mode:  External US (Maris Tovar RN)

 FHR Baseline Rate :  130 (Maris Tovar RN)

 FHR Baseline Changes:  No Baseline Change (Maris Tovar, RN)

 Variability:  Moderate 6-25 bpm (Maris Tovar, RN)

 Accelerations:  15X15 (Maris Tovar RN)

 Decelerations:  None (Maris Tovar, RN)

   

=================================================================

Datetime: 02/07/2017 13:24

=================================================================

   

 Temperature (F):  98.4 (Maris Tovar RN)

 Temperature (C):  36.9 (QS system process)

 Temperature Route:  Oral (Maris Tovar RN)

 LaborFlag:  Antepartum (QS system process)

   

=================================================================

Datetime: 02/07/2017 13:21

=================================================================

   

 NBP Sys/Berta/Mean (mmHg):  101 (QS system process)

:  59 (QS system process)

:  75 (QS system process)

 Pulse:  98 (QS system process)

 LaborFlag:  Antepartum (QS system process)

   

=================================================================

Datetime: 02/07/2017 13:15

=================================================================

   

 Monitor Mode:  External; Palpation (Maris Guy, RN)

 Frequency (min):  x1 (Maris Guy, RN)

 Quality:  Mild (Maris Guy, RN)

 Duration (sec):  70 (Maris Guy, RN)

 Duration Criteria:  Less than Two 120 Second Contractions (Maris

   Guy, RN)

 Pattern:  Normal: <= 5 Contractions in 10 Minutes (Maris Guy, RN)

 Resting Tone (Palpate):  Relaxed (Maris Guy, RN)

 Monitor Mode:  External US (Maris Guy, RN)

 FHR Baseline Rate :  130 (Maris Guy, RN)

 FHR Baseline Changes:  No Baseline Change (Maris Guy, RN)

 Variability:  Moderate 6-25 bpm (Maris Guy, RN)

 Accelerations:  15X15 (Maris Guy, RN)

 Decelerations:  None (Maris Guy, RN)

   

=================================================================

Datetime: 02/07/2017 13:00

=================================================================

   

 Monitor Mode:  External; Palpation (Maris Guy, RN)

 Frequency (min):  x1 (Maris Guy, RN)

 Quality:  Mild (Maris Guy, RN)

 Duration (sec):  50 (Maris Tovar RN)

 Duration Criteria:  Less than Two 120 Second Contractions (Maris Tovar RN)

 Pattern:  Normal: <= 5 Contractions in 10 Minutes (Maris Tovar RN)

 Resting Tone (Palpate):  Relaxed (Maris Tovar RN)

 Monitor Mode:  External US (Maris Tovar RN)

 FHR Baseline Rate :  130 (Maris Tovar RN)

 FHR Baseline Changes:  No Baseline Change (Maris Tovar RN)

 Variability:  Moderate 6-25 bpm (Maris Tovar RN)

 Accelerations:  15X15 (Maris Tovar RN)

 Decelerations:  None (Maris Tovar RN)

 Level of Consciousness:  Fully Conscious (Maris Tovar RN)

 DTR's/Clonus:  DTRs 2+; No Clonus (Maris Tovar RN)

 Headache:  Denies (Maris Tovar RN)

 Breath Sounds, Left:  Clear and Equal (Maris Tovar RN)

 Breath Sounds, Right:  Clear and Equal (Maris Tovar RN)

 Nausea/Vomiting:  Denies (Maris Tovar RN)

 RUQ Epigastric Pain:  Denies (Maris Tovar RN)

   

=================================================================

Datetime: 02/07/2017 12:51

=================================================================

   

 NBP Sys/Berta/Mean (mmHg):  103 (QS system process)

:  57 (QS system process)

:  75 (QS system process)

 Pulse:  98 (QS system process)

 LaborFlag:  Antepartum (QS system process)

   

=================================================================

Datetime: 02/07/2017 12:45

=================================================================

   

 Monitor Mode:  External; Palpation (Maris Tovar, RN)

 Quality:  Mild (Maris Tovar, RN)

 Resting Tone (Palpate):  Relaxed (Maris Tovar, RN)

 Contraction Comments:  irritability (Maris Tovar, RN)

 Monitor Mode:  External US (Maris Tovar, RN)

 FHR Baseline Rate :  130 (Maris Tovar, RN)

 FHR Baseline Changes:  No Baseline Change (Maris Tovar, RN)

 Variability:  Moderate 6-25 bpm (Maris Tovar, RN)

 Accelerations:  15X15 (Maris Tovar, RN)

 Decelerations:  None (Maris Tovar, RN)

   

=================================================================

Datetime: 02/07/2017 12:30

=================================================================

   

 Monitor Mode:  External; Palpation (Maris Tovar, RN)

 Frequency (min):  x1 (Maris Tovar, RN)

 Quality:  Mild (Maris Tovar, RN)

 Duration (sec):  40 (Maris Tovar, RN)

 Duration Criteria:  Less than Two 120 Second Contractions (Maris Tovar, RN)

 Pattern:  Normal: <= 5 Contractions in 10 Minutes (Maris Tovar, RN)

 Resting Tone (Palpate):  Relaxed (Maris Tovar, RN)

 Contraction Comments:  irritability (Maris Tovar, RN)

 Monitor Mode:  External US (Maris Tovar, RN)

 FHR Baseline Rate :  130 (Maris Tovar, RN)

 FHR Baseline Changes:  No Baseline Change (Maris Tovar, RN)

 Variability:  Moderate 6-25 bpm (Maris Tovar RN)

 Accelerations:  15X15 (Maris Tovar, RN)

 Decelerations:  None (Maris Tovar, RN)

   

=================================================================

Datetime: 02/07/2017 12:22

=================================================================

   

 NBP Sys/Berta/Mean (mmHg):  135 (QS system process)

:  58 (QS system process)

:  83 (QS system process)

 Pulse:  116 (QS system process)

 LaborFlag:  Antepartum (QS system process)

   

=================================================================

Datetime: 02/07/2017 12:15

=================================================================

   

 Monitor Mode:  External; Palpation (Maris Tovar RN)

 Quality:  Mild (Maris Tovar RN)

 Resting Tone (Palpate):  Relaxed (Maris Tovar RN)

 Contraction Comments:  irritability (Maris Tovar RN)

 Monitor Mode:  External US (Maris Tovar RN)

 FHR Baseline Rate :  125 (Maris Tovar RN)

 FHR Baseline Changes:  No Baseline Change (Maris Tovar RN)

 Variability:  Moderate 6-25 bpm (Maris Tovar, RN)

 Accelerations:  None (Maris Tovar, RN)

 Decelerations:  None (Maris Tovar, RN)

   

=================================================================

Datetime: 02/07/2017 12:14

=================================================================

   

 Monitor Interventions for FHR:  Ultrasound Adjusted (Maris Guy,

   RN)

 Communication Comments:  patient sitting up eating lunch (Maris

   Guy, RN)

   

=================================================================

Datetime: 02/07/2017 12:09

=================================================================

   

 Monitor Interventions for FHR:  Ultrasound Adjusted (Maris Guy,

   RN)

   

=================================================================

Datetime: 02/07/2017 12:06

=================================================================

   

 Communication:  RN at Bedside (Maris Guy, RN)

 Communication Comments:  tracing maternal heart rate (Maris Guy,

   RN)

   

=================================================================

Datetime: 02/07/2017 12:00

=================================================================

   

 Monitor Mode:  External; Palpation (Maris Tovar RN)

 Frequency (min):  x1 (Maris Tovar RN)

 Quality:  Mild (Maris Tovar RN)

 Duration (sec):  50 (Maris Tovar RN)

 Resting Tone (Palpate):  Relaxed (Maris Tovar RN)

 Monitor Mode:  External US (Maris Tovar RN)

 FHR Baseline Rate :  130 (Maris Tovar RN)

 FHR Baseline Changes:  No Baseline Change (Maris Tovar RN)

 Variability:  Moderate 6-25 bpm (Maris Tovar RN)

 Accelerations:  None (Maris Tovar RN)

 Decelerations:  None (Maris Tovar RN)

 Level of Consciousness:  Fully Conscious (Maris Tovar RN)

 DTR's/Clonus:  DTRs 2+; No Clonus (Maris Tovar RN)

 Headache:  Denies (Maris Tovar RN)

 Breath Sounds, Left:  Clear and Equal (Maris Tovar RN)

 Breath Sounds, Right:  Clear and Equal (Maris Tovar RN)

 Nausea/Vomiting:  Denies (Maris Tovar RN)

 RUQ Epigastric Pain:  Denies (Maris Tovar RN)

## 2017-02-07 NOTE — L&D FLOW SHEET
=================================================================

LD Flowsheet

=================================================================

Datetime Report Generated by CPN: 02/07/2017 20:00

   

   

=================================================================

Datetime: 02/07/2017 19:51

=================================================================

   

 NBP Sys/Berta/Mean (mmHg):  99 (QS system process)

:  50 (QS system process)

:  70 (QS system process)

 Pulse:  90 (QS system process)

 LaborFlag:  Antepartum (QS system process)

   

=================================================================

Datetime: 02/07/2017 19:21

=================================================================

   

 NBP Sys/Berta/Mean (mmHg):  104 (QS system process)

:  55 (QS system process)

:  74 (QS system process)

 Pulse:  88 (QS system process)

 LaborFlag:  Antepartum (QS system process)

   

=================================================================

Datetime: 02/07/2017 19:00

=================================================================

   

 Monitor Mode:  External; Palpation (Maris Tovar RN)

 Resting Tone (Palpate):  Relaxed (Maris Tovar RN)

 Contraction Comments:  irritability (Maris Tovar RN)

 Monitor Mode:  External US (Maris Tovar RN)

 Comments:  unable to determine baseline due to patient movement, RN at

   bedside adjusting monitor (Maris Tovar RN)

 Level of Consciousness:  Fully Conscious (Maris Tovar RN)

 DTR's/Clonus:  DTRs 2+; No Clonus (Maris Tovar RN)

 Headache:  Denies (Maris Tovar RN)

 Breath Sounds, Left:  Clear and Equal (Maris Tovar RN)

 Breath Sounds, Right:  Clear and Equal (Maris Tovar RN)

 Nausea/Vomiting:  Denies (Maris Tovar RN)

 RUQ Epigastric Pain:  Denies (Maris Tovar RN)

 Magnesium/Antihypertensives:  Magnesium Sulfate IV (Gm/hr) @

   (Annotations: 2) (Maris Tovar RN)

   

=================================================================

Datetime: 02/07/2017 18:51

=================================================================

   

 NBP Sys/Berta/Mean (mmHg):  114 (QS system process)

:  62 (QS system process)

:  80 (QS system process)

 Pulse:  111 (QS system process)

 LaborFlag:  Antepartum (QS system process)

   

=================================================================

Datetime: 02/07/2017 18:45

=================================================================

   

 Monitor Mode:  External; Palpation (Maris Tovar RN)

 Quality:  Mild (Maris Tovar RN)

 Duration Criteria:  Less than Two 120 Second Contractions (Maris Tovar RN)

 Pattern:  Normal: <= 5 Contractions in 10 Minutes (Maris Tovar RN)

 Resting Tone (Palpate):  Relaxed (Maris Tovar RN)

 Contraction Comments:  irritability, no contractions palpated (Maris Tovar RN)

 Monitor Mode:  External US (Maris Tovar RN)

 FHR Baseline Rate :  125 (Maris Tovar RN)

 FHR Baseline Changes:  No Baseline Change (Maris Tovar RN)

 Variability:  Moderate 6-25 bpm (Maris Tovar RN)

 Accelerations:  None (Maris Tovar RN)

 Decelerations:  None (Maris Tovar RN)

   

=================================================================

Datetime: 02/07/2017 18:30

=================================================================

   

 Monitor Mode:  External; Palpation (Maris Tovar, RN)

 Quality:  Mild (Maris Tovar, RN)

 Duration Criteria:  Less than Two 120 Second Contractions (Maris Tovar, RN)

 Pattern:  Normal: <= 5 Contractions in 10 Minutes (Maris Tovar, RN)

 Resting Tone (Palpate):  Relaxed (Maris Tovar, RN)

 Contraction Comments:  irritability, no contractions palpated (Maris Tovar, RN)

 Monitor Mode:  External US (Maris Tovar, RN)

 FHR Baseline Rate :  125 (Maris Tovar, RN)

 FHR Baseline Changes:  No Baseline Change (Maris Tovar, RN)

 Variability:  Moderate 6-25 bpm (Marsi Tovar, RN)

 Accelerations:  10X10 (Maris Tovar, RN)

 Decelerations:  None (Maris Tovar, RN)

   

=================================================================

Datetime: 02/07/2017 18:21

=================================================================

   

 NBP Sys/Berta/Mean (mmHg):  117 (QS system process)

:  80 (QS system process)

:  92 (QS system process)

 Pulse:  105 (QS system process)

 LaborFlag:  Antepartum (QS system process)

   

=================================================================

Datetime: 02/07/2017 18:15

=================================================================

   

 Monitor Mode:  External; Palpation (Maris Tovar, VIDYA)

 Quality:  Mild (Maris Toavr, RN)

 Duration Criteria:  Less than Two 120 Second Contractions (Maris Tovar, RN)

 Pattern:  Normal: <= 5 Contractions in 10 Minutes (Maris Tovar, RN)

 Resting Tone (Palpate):  Relaxed (Maris Tovar, RN)

 Contraction Comments:  irritability (Maris Tovar RN)

 Monitor Mode:  External US (Maris Tovar RN)

 FHR Baseline Rate :  130 (Maris Tovar, RN)

 FHR Baseline Changes:  No Baseline Change (Maris Tovar, VIDYA)

 Variability:  Moderate 6-25 bpm (Maris Tovar, RN)

 Accelerations:  15X15 (Maris Tovar, RN)

 Decelerations:  None (Maris Tovar RN)

 Medication Comments:  Betamethasone 12 mg IM left gluteus, per order

   (Maris Tovar RN)

   

=================================================================

Datetime: 02/07/2017 18:00

=================================================================

   

 Monitor Mode:  External; Palpation (Maris Tovar, VIDYA)

 Quality:  Mild (Maris Tovar, VIDYA)

 Duration Criteria:  Less than Two 120 Second Contractions (Maris Tovar, RN)

 Pattern:  Normal: <= 5 Contractions in 10 Minutes (Maris Tovar, RN)

 Resting Tone (Palpate):  Relaxed (Maris Tovar, RN)

 Contraction Comments:  irritability (Maris Tovar RN)

 Monitor Mode:  External US (Maris Tovar RN)

 FHR Baseline Rate :  130 (Maris Tovar, RN)

 FHR Baseline Changes:  No Baseline Change (Maris Tovar, RN)

 Variability:  Moderate 6-25 bpm (Maris Tovar, RN)

 Accelerations:  10X10 (Maris Tovar, RN)

 Decelerations:  None (Maris Tovar, VIDYA)

 Level of Consciousness:  Fully Conscious (Maris Tovar RN)

 DTR's/Clonus:  DTRs 2+; No Clonus (Maris Tovar RN)

 Headache:  Denies (Maris Tovar RN)

 Breath Sounds, Left:  Clear and Equal (Maris Tovar RN)

 Breath Sounds, Right:  Clear and Equal (Maris Tovar RN)

 Nausea/Vomiting:  Denies (Maris Tovar RN)

 RUQ Epigastric Pain:  Denies (Maris Tovar RN)

 Magnesium/Antihypertensives:  Magnesium Sulfate IV (Gm/hr) @

   (Annotations: 2) (Maris Tovar RN)

## 2017-02-07 NOTE — L&D FLOW SHEET
=================================================================

LD Flowsheet

=================================================================

Datetime Report Generated by CPN: 02/07/2017 12:00

   

   

=================================================================

Datetime: 02/07/2017 11:51

=================================================================

   

 NBP Sys/Berta/Mean (mmHg):  121 (QS system process)

:  58 (QS system process)

:  83 (QS system process)

 Pulse:  106 (QS system process)

 LaborFlag:  Antepartum (QS system process)

   

=================================================================

Datetime: 02/07/2017 11:49

=================================================================

   

 Monitor Interventions for FHR:  Ultrasound Adjusted (Maris Tovar RN)

 Communication Comments:  tracing maternal heart rate (Maris Guy,

   RN)

   

=================================================================

Datetime: 02/07/2017 11:45

=================================================================

   

 Monitor Mode:  External; Palpation (Maris Tovar, RN)

 Quality:  Mild (Maris Guy, RN)

 Resting Tone (Palpate):  Relaxed (Maris Guy, RN)

 Contraction Comments:  irritability (Maris Tovar, RN)

 Monitor Mode:  External US (Maris Guy, RN)

 FHR Baseline Rate :  130 (Maris Tovar, RN)

 FHR Baseline Changes:  No Baseline Change (Maris Tovar, RN)

 Variability:  Moderate 6-25 bpm (Maris Guy, RN)

 Accelerations:  15X15 (Maris Guy, RN)

 Decelerations:  None (Maris Guy, RN)

   

=================================================================

Datetime: 02/07/2017 11:30

=================================================================

   

 Monitor Mode:  External; Palpation (Mrais Guy, RN)

 Frequency (min):  x1 (Maris Guy, RN)

 Quality:  Mild (Maris Guy, RN)

 Duration (sec):  80 (Maris Guy, RN)

 Resting Tone (Palpate):  Relaxed (Maris Tovar, RN)

 Monitor Mode:  External US (Maris Tovar, RN)

 FHR Baseline Rate :  130 (Maris Tovar RN)

 FHR Baseline Changes:  No Baseline Change (Maris Tovar RN)

 Variability:  Moderate 6-25 bpm (Maris Tovar RN)

 Accelerations:  15X15 (Maris Tovar RN)

 Decelerations:  None (Maris Tovar RN)

   

=================================================================

Datetime: 02/07/2017 11:21

=================================================================

   

 NBP Sys/Berta/Mean (mmHg):  120 (QS system process)

:  64 (QS system process)

:  83 (QS system process)

 Pulse:  104 (QS system process)

 LaborFlag:  Antepartum (QS system process)

   

=================================================================

Datetime: 02/07/2017 11:15

=================================================================

   

 Monitor Mode:  External; Palpation (Maris Tovar RN)

 Frequency (min):  x2 (Maris Tovar RN)

 Quality:  Mild (Maris Tovar RN)

 Duration (sec):  50-60 (Maris Tovar RN)

 Resting Tone (Palpate):  Relaxed (Maris Tovar RN)

 Monitor Mode:  External US (Maris Tovar RN)

 FHR Baseline Rate :  130 (Maris Tovar RN)

 FHR Baseline Changes:  No Baseline Change (Maris Tovar RN)

 Variability:  Moderate 6-25 bpm (Maris Tovar RN)

 Accelerations:  15X15 (Maris Tovar, VIDYA)

 Decelerations:  None (Maris Tovar, RN)

   

=================================================================

Datetime: 02/07/2017 11:06

=================================================================

   

 Magnesium/Antihypertensives:  Magnesium Sulfate IV (Gm/hr) @

   (Annotations: 2) (Maris Tovar, VIDYA)

   

=================================================================

Datetime: 02/07/2017 11:00

=================================================================

   

 Monitor Mode:  External; Palpation (Maris Tovar RN)

 Quality:  Mild (Maris Tovar RN)

 Resting Tone (Palpate):  Relaxed (Maris Tovar RN)

 Contraction Comments:  irritability (Maris Tovar RN)

 Monitor Mode:  External US (Maris Tovar RN)

 FHR Baseline Rate :  130 (Maris Tovar RN)

 FHR Baseline Changes:  No Baseline Change (Maris Tovar RN)

 Variability:  Moderate 6-25 bpm (Maris Tovar, RN)

 Accelerations:  15X15 (Maris Tovar, RN)

 Decelerations:  None (Maris Tovar, VIDYA)

 Level of Consciousness:  Fully Conscious (Maris Tovar RN)

 DTR's/Clonus:  DTRs 2+; No Clonus (Maris Tovar RN)

 Headache:  Denies (Maris Tovar RN)

 Breath Sounds, Left:  Clear and Equal (Maris Tovar RN)

 Breath Sounds, Right:  Clear and Equal (Maris Tovar RN)

 Nausea/Vomiting:  Denies (Maris Tovar RN)

 RUQ Epigastric Pain:  Denies (Maris Tovar RN)

   

=================================================================

Datetime: 02/07/2017 10:51

=================================================================

   

 NBP Sys/Berta/Mean (mmHg):  111 (QS system process)

:  58 (QS system process)

:  77 (QS system process)

 Pulse:  96 (QS system process)

 LaborFlag:  Antepartum (QS system process)

   

=================================================================

Datetime: 02/07/2017 10:45

=================================================================

   

 Monitor Mode:  External; Palpation (Maris Tovar RN)

 Quality:  Mild (Maris Tovar RN)

 Resting Tone (Palpate):  Relaxed (Maris Tovar RN)

 Contraction Comments:  irritability (Maris Tovar RN)

 Monitor Mode:  External US (Maris Tovar RN)

 FHR Baseline Rate :  130 (Maris Tovar RN)

 FHR Baseline Changes:  No Baseline Change (Maris Tovar RN)

 Variability:  Moderate 6-25 bpm (Maris Tovar RN)

 Accelerations:  15X15 (Maris Tovar, RN)

 Decelerations:  None (Maris Tovar, RN)

   

=================================================================

Datetime: 02/07/2017 10:32

=================================================================

   

 Monitor Interventions for UA:  [place] Adjusted (Maris Tovar, RN)

   

=================================================================

Datetime: 02/07/2017 10:30

=================================================================

   

 Monitor Mode:  External; Palpation (Maris Tovar RN)

 Quality:  Mild (Maris Tovar RN)

 Resting Tone (Palpate):  Relaxed (Maris Tovar RN)

 Contraction Comments:  RN at bedside adjusting monitor (Maris Tovar RN)

 Monitor Mode:  External US (Maris Tovar RN)

 FHR Baseline Rate :  130 (Maris Tovar RN)

 FHR Baseline Changes:  No Baseline Change (Maris Tovar RN)

 Variability:  Moderate 6-25 bpm (Maris Tovar RN)

 Accelerations:  10X10 (Maris Tovar RN)

 Decelerations:  None (Maris Tovar, RN)

   

=================================================================

Datetime: 02/07/2017 10:21

=================================================================

   

 NBP Sys/Berta/Mean (mmHg):  107 (QS system process)

:  56 (QS system process)

:  74 (QS system process)

 Pulse:  87 (QS system process)

 LaborFlag:  Antepartum (QS system process)

   

=================================================================

Datetime: 02/07/2017 10:15

=================================================================

   

 Monitor Mode:  External; Palpation (Maris Tovar RN)

 Quality:  Mild (Maris Tovar RN)

 Duration Criteria:  Less than Two 120 Second Contractions (Maris Tovar RN)

 Pattern:  Normal: <= 5 Contractions in 10 Minutes (Maris Tovar RN)

 Resting Tone (Palpate):  Relaxed (Maris Tovar RN)

 Contraction Comments:  no contracions tracing on monitor, RN at

   bedside palpating abdomen and adjusting monitor (Maris Tovar RN)

 Monitor Mode:  External US (Maris Tovar RN)

 FHR Baseline Rate :  130 (Maris Tovar RN)

 FHR Baseline Changes:  No Baseline Change (Maris Tovar RN)

 Variability:  Moderate 6-25 bpm (Maris Tovar RN)

 Accelerations:  15X15 (Maris Tovar RN)

 Decelerations:  None (Maris Tovar RN)

   

=================================================================

Datetime: 02/07/2017 10:05

=================================================================

   

 Monitor Interventions for UA:  [place] Adjusted (Maris Tovar RN)

   

=================================================================

Datetime: 02/07/2017 10:00

=================================================================

   

 Monitor Mode:  External; Palpation (Maris Tovar RN)

 Quality:  Mild (Maris Tovar RN)

 Duration Criteria:  Less than Two 120 Second Contractions (Maris Tovar RN)

 Pattern:  Normal: <= 5 Contractions in 10 Minutes (Maris Tovar RN)

 Resting Tone (Palpate):  Non Relaxed (Maris Tovar RN)

 Contraction Comments:  no contractions tracing on monitor, patient

   states she still feels contractions occasionally, RN at bedside

   adjusting monitor and palpating contractions. Abdomen relaxed (Maris Tovar RN)

 Monitor Mode:  External US (Maris Tovar RN)

 FHR Baseline Rate :  130 (Maris Tovar RN)

 FHR Baseline Changes:  No Baseline Change (Maris Tovar RN)

 Variability:  Moderate 6-25 bpm (Maris Tovar RN)

 Accelerations:  15X15 (Maris Tovar RN)

 Decelerations:  None (Maris Tovar RN)

 Level of Consciousness:  Fully Conscious (Maris Tovar RN)

 DTR's/Clonus:  DTRs 2+; No Clonus (Maris Tovar RN)

 Headache:  Denies (Maris Tovar RN)

 Breath Sounds, Left:  Clear and Equal (Maris Tovar RN)

 Nausea/Vomiting:  Denies (Maris Tovar RN)

 RUQ Epigastric Pain:  Denies (Maris Tovar RN)

## 2017-02-07 NOTE — L&D FLOW SHEET
=================================================================

LD Flowsheet

=================================================================

Datetime Report Generated by CPN: 02/07/2017 08:00

   

   

=================================================================

Datetime: 02/07/2017 07:55

=================================================================

   

 Communication Comments:  order received from Dr. Araujo for patient

   to take home prescription for Synthroid (Maris Guy, RN)

   

=================================================================

Datetime: 02/07/2017 07:51

=================================================================

   

 NBP Sys/Berta/Mean (mmHg):  116 (QS system process)

:  67 (QS system process)

:  84 (QS system process)

 Pulse:  93 (QS system process)

 LaborFlag:  Antepartum (QS system process)

   

=================================================================

Datetime: 02/07/2017 07:45

=================================================================

   

 Monitor Mode:  External; Palpation (Maris Tovar, RN)

 Frequency (min):  x1 (Maris Tovar, RN)

 Quality:  Mild (Maris Tovar, RN)

 Duration (sec):  60 (Maris Tovar, RN)

 Duration Criteria:  Less than Two 120 Second Contractions (Maris Tovar, RN)

 Pattern:  Normal: <= 5 Contractions in 10 Minutes (Maris Tovar, RN)

 Resting Tone (Palpate):  Relaxed (Maris Tovar, RN)

 Monitor Mode:  External US (Maris Tovar, RN)

 FHR Baseline Rate :  130 (Maris Tovar, RN)

 FHR Baseline Changes:  No Baseline Change (Maris Tovar, RN)

 Variability:  Moderate 6-25 bpm (Maris Guy, RN)

 Accelerations:  10X10 (Marislianne Tovar, RN)

 Decelerations:  None (Maris Tovar, RN)

   

=================================================================

Datetime: 02/07/2017 07:40

=================================================================

   

 Communication Comments:  Order received from Dr. Araujo for patient

   to eat breakfast (Maris Tovar RN)

   

=================================================================

Datetime: 02/07/2017 07:30

=================================================================

   

 Monitor Mode:  External; Palpation (Maris Tovar RN)

 Frequency (min):  8 (Maris Tovar RN)

 Quality:  Mild (Maris Tovar, VIDYA)

 Duration (sec):  50-60 (Maris Tovar RN)

 Duration Criteria:  Less than Two 120 Second Contractions (Maris Tovar RN)

 Pattern:  Normal: <= 5 Contractions in 10 Minutes (Maris Tovar RN)

 Resting Tone (Palpate):  Relaxed (Maris Tovar RN)

 Monitor Mode:  External US (Maris Tovar RN)

 FHR Baseline Rate :  130 (Maris Tovar RN)

 FHR Baseline Changes:  No Baseline Change (Maris Tovar RN)

 Variability:  Moderate 6-25 bpm (Maris Tovar, RN)

 Accelerations:  10X10 (Maris Tovar, RN)

 Decelerations:  None (Maris Tovar RN)

   

=================================================================

Datetime: 02/07/2017 07:28

=================================================================

   

 Level of Consciousness:  Fully Conscious (Maris Tovar RN)

 Nausea/Vomiting:  Denies (Maris Tovar RN)

 RUQ Epigastric Pain:  Denies (Maris Tovar RN)

 Magnesium/Antihypertensives:  Magnesium Sulfate IV (Gm/hr) @

   (Annotations: 2/gm hour) (Maris Guy, RN)

   

=================================================================

Datetime: 02/07/2017 07:21

=================================================================

   

 Monitor Interventions for FHR:  Ultrasound Adjusted (Maris Tovar,

   RN)

 Communication Comments:  report recieved from K. Fore, RN (Maris

   Guy, RN)

   

=================================================================

Datetime: 02/07/2017 07:20

=================================================================

   

 Communication Comments:  Report given to M Guy RN. Care

   relinquished at this time. (Bobbi Errichiello, RN)

   

=================================================================

Datetime: 02/07/2017 07:15

=================================================================

   

 Monitor Mode:  External; Palpation (Maris Tovar RN)

 Frequency (min):  4.5-7 (Maris Tovar RN)

 Quality:  Mild (Maris Tovar RN)

 Duration (sec):  40-70 (Maris Tovar RN)

 Duration Criteria:  Less than Two 120 Second Contractions (Maris Tovar RN)

 Pattern:  Normal: <= 5 Contractions in 10 Minutes (Maris Tovar RN)

 Resting Tone (Palpate):  Relaxed (Maris Tovar RN)

 Monitor Mode:  External US (Maris Tovar RN)

 FHR Baseline Rate :  125 (Maris Tovar RN)

 FHR Baseline Changes:  No Baseline Change (Maris Tovar RN)

 Variability:  Moderate 6-25 bpm (Maris Tovar RN)

 Accelerations:  15X15 (Maris Tovar RN)

 Decelerations:  None (Maris Tovar RN)

   

=================================================================

Datetime: 02/07/2017 07:00

=================================================================

   

Stage of Pregnancy:  Antepartum (Bobbi Irwin RN)

 Monitor Mode:  External (Bobbi Irwin RN)

 Frequency (min):  5-8 (Bobbi Irwin RN)

 Quality:  Mild (Bobbi Irwin RN)

 Duration (sec):  50-80 (Bobbi Irwin RN)

 Resting Tone (Palpate):  Relaxed (Bobbi Irwin RN)

 Monitor Mode:  External US (Bobbi Irwin RN)

 Monitor Interventions for FHR:  Ultrasound Adjusted (Bobbi Irwin RN)

 FHR Baseline Rate :  125 (Bobbi Irwin RN)

 FHR Baseline Changes:  No Baseline Change (Bobbi Irwin RN)

 Variability:  Moderate 6-25 bpm (Bobbi Irwin RN)

 Accelerations:  15X15 (Bobbi Irwin RN)

 Decelerations:  None (Bobbi Irwin RN)

 Pain Presence:  None/Denies (Bobbi Irwin RN)

 Level of Consciousness:  Fully Conscious (Bobbi Irwin RN)

 DTR's/Clonus:  DTRs 2+; No Clonus (Bobbi Irwin RN)

 Headache:  Denies (Bobbi Irwin RN)

 Breath Sounds, Left:  Clear and Equal (Bobbi Irwin RN)

 Breath Sounds, Right:  Clear and Equal (Bobbi Irwin RN)

 Nausea/Vomiting:  Denies (Bobbi Irwin RN)

 RUQ Epigastric Pain:  Denies (Bobbi Irwin RN)

 Communication:  RN at Bedside; RN Reviewed Strip (Bobbi Irwin RN)

 LaborFlag:  Antepartum (QS system process)

   

=================================================================

Datetime: 02/07/2017 06:42

=================================================================

   

 Monitor Interventions for UA:  Duck Key Adjusted (Bobbi Irwin RN)

 Monitor Interventions for FHR:  Ultrasound Adjusted (Bobbi Irwin RN)

   

=================================================================

Datetime: 02/07/2017 06:30

=================================================================

   

Stage of Pregnancy:  Antepartum (Bobbi Irwin RN)

 Monitor Mode:  External (Bobbi Irwin RN)

 Frequency (min):  x2 (Bobbi Irwin RN)

 Quality:  Mild (Bobbi Irwin RN)

 Duration (sec):  50-60 (Bobbi Irwin RN)

 Resting Tone (Palpate):  Relaxed (Bobbi Irwin RN)

 Monitor Mode:  External US (Bobbi Irwin RN)

 FHR Baseline Rate :  120 (Bobbi Irwin RN)

 FHR Baseline Changes:  No Baseline Change (Bobbi Irwin RN)

 Variability:  Moderate 6-25 bpm (Bobbi Irwin RN)

 Accelerations:  15X15 (Bobbi Irwin RN)

 Decelerations:  None (Bobbi Irwin RN)

 Pain Presence:  None/Denies (Bobbi Irwin RN)

 Patient Position/Activity:  Right Tilt; Semi-Fowlers (Bobbi Irwin RN)

 Communication:  RN Reviewed Strip (Bobbi Irwin RN)

 LaborFlag:  Antepartum (QS system process)

   

=================================================================

Datetime: 02/07/2017 06:22

=================================================================

   

 NBP Sys/Berta/Mean (mmHg):  114 (QS system process)

:  61 (QS system process)

:  80 (QS system process)

 Pulse:  83 (QS system process)

 LaborFlag:  Antepartum (QS system process)

   

=================================================================

Datetime: 02/07/2017 06:00

=================================================================

   

Stage of Pregnancy:  Antepartum (Bobbi Irwin RN)

 Monitor Mode:  External (Bobbi Irwin RN)

 Frequency (min):  n/a (Bobbi Irwin RN)

 Quality:  Mild (Bobbi Irwin RN)

 Resting Tone (Palpate):  Relaxed (Bobbi Irwin RN)

 Monitor Mode:  External US (Bobbi Irwin RN)

 FHR Baseline Rate :  120 (Bobbi Irwin RN)

 FHR Baseline Changes:  No Baseline Change (Bobbi Irwin RN)

 Variability:  Moderate 6-25 bpm (Bobbi Irwin RN)

 Accelerations:  15X15 (Bobbi Irwin RN)

 Decelerations:  None (Bobbi Irwin RN)

 Level of Consciousness:  Fully Conscious (Bobbi Irwin RN)

 DTR's/Clonus:  DTRs 2+; No Clonus (Bobbi Irwin RN)

 Headache:  Denies (Bobbi Irwin RN)

 Breath Sounds, Left:  Clear and Equal (Bobbi Irwin RN)

 Breath Sounds, Right:  Clear and Equal (Bobbi Irwin RN)

 Nausea/Vomiting:  Denies (Bobbi Irwin RN)

 RUQ Epigastric Pain:  Denies (Bobbi Irwin RN)

 Communication:  RN at Bedside; RN Reviewed Strip (Bobbi Irwin RN)

   

=================================================================

Datetime: 02/07/2017 05:30

=================================================================

   

Stage of Pregnancy:  Antepartum (Bobbi Irwin RN)

 Monitor Mode:  External (Bobbi Irwin RN)

 Frequency (min):  occasional (Bobbi Irwin RN)

 Quality:  Mild (Bobbi Irwin RN)

 Duration (sec):  60-90 (Bobbi Irwin RN)

 Resting Tone (Palpate):  Relaxed (Bobbi Irwin RN)

 Monitor Mode:  External US (Bobbi Irwin RN)

 Monitor Interventions for FHR:  Ultrasound Adjusted (Bobbi Irwin RN)

 FHR Baseline Rate :  125 (Bobbi Irwin RN)

 FHR Baseline Changes:  No Baseline Change (Bobbi Irwin RN)

 Variability:  Moderate 6-25 bpm (Bobbi Irwin RN)

 Accelerations:  15X15 (Bobbi Irwin RN)

 Decelerations:  None (Bobbi Irwin RN)

 Pain Presence:  None/Denies (Bobbi Irwin RN)

 Communication:  RN Reviewed Strip (Bobbi Irwin RN)

 LaborFlag:  Antepartum (QS system process)

   

=================================================================

Datetime: 02/07/2017 05:00

=================================================================

   

Stage of Pregnancy:  Antepartum (Bobbi Irwin RN)

 Monitor Mode:  External (Bobbi Irwin RN)

 Monitor Interventions for UA:  Duck Key Adjusted (Bobbi Irwin RN)

 Frequency (min):  5 (Bobbi Irwin RN)

 Quality:  Mild (Bobbi Irwin RN)

 Duration (sec):  50-80 (Bobbi Irwin RN)

 Resting Tone (Palpate):  Relaxed (Bobbi Irwin RN)

 Monitor Mode:  External US (Bobbi Irwin RN)

 Monitor Interventions for FHR:  Ultrasound Adjusted (Bobbi Irwin RN)

 FHR Baseline Rate :  115 (Bobbi Irwin RN)

 FHR Baseline Changes:  No Baseline Change (Bobbi Irwin RN)

 Variability:  Moderate 6-25 bpm (Bobbi Irwin RN)

 Accelerations:  15X15 (Bobbi Irwin RN)

 Decelerations:  None (Bobbi Irwin RN)

 Level of Consciousness:  Fully Conscious (Bobbi Irwin RN)

 DTR's/Clonus:  DTRs 2+; No Clonus (Bobbi Irwin RN)

 Headache:  Denies (Bobbi Irwin RN)

 Breath Sounds, Left:  Clear and Equal (Bobbi Irwin RN)

 Breath Sounds, Right:  Clear and Equal (Bobbi Irwin RN)

 Nausea/Vomiting:  Denies (Bobbi Irwin RN)

 RUQ Epigastric Pain:  Denies (Bobbi Irwin RN)

 Communication:  RN at Bedside; RN Reviewed Strip (Bobbi Irwin RN)

   

=================================================================

Datetime: 02/07/2017 04:33

=================================================================

   

 Magnesium/Antihypertensives:  Magnesium Sulfate IV (Gm/hr) @ 2

   (Bobbi Irwin RN)

   

=================================================================

Datetime: 02/07/2017 04:32

=================================================================

   

 Antibiotics:  Penicillin IV (Units) @ [id number] (Bobbi Irwin RN)

   

=================================================================

Datetime: 02/07/2017 04:30

=================================================================

   

Stage of Pregnancy:  Antepartum (Bobbi Irwin RN)

 Monitor Mode:  External (Bobbi Irwin RN)

 Frequency (min):  x2 (Bobbi Irwin RN)

 Quality:  Mild (Bobbi Irwin RN)

 Duration (sec):  60-80 (Bobbi Irwin RN)

 Resting Tone (Palpate):  Relaxed (Bobbi Irwin RN)

 Monitor Mode:  External US (Bobbi Irwin RN)

 FHR Baseline Rate :  125 (Bobbi Irwin RN)

 FHR Baseline Changes:  No Baseline Change (Bobbi Irwin RN)

 Variability:  Moderate 6-25 bpm (Bobbi Irwin RN)

 Accelerations:  15X15 (Bobbi Irwin RN)

 Decelerations:  None (Bobbi Irwin RN)

 Magnesium/Antihypertensives:  Magnesium Sulfate IV (Gm/hr) @ 2

   (Bobbi Irwin RN)

 Patient Position/Activity:  Right Tilt; Semi-Fowlers (Bobbi Irwin RN)

 Communication:  RN at Bedside; RN Reviewed Strip (Bobbi Irwin RN)

   

=================================================================

Datetime: 02/07/2017 04:21

=================================================================

   

 NBP Sys/Berta/Mean (mmHg):  125 (QS system process)

:  65 (QS system process)

:  87 (QS system process)

 Pulse:  99 (QS system process)

 LaborFlag:  Antepartum (QS system process)

   

=================================================================

Datetime: 02/07/2017 04:00

=================================================================

   

Stage of Pregnancy:  Antepartum (Bobbi Irwin RN)

 Monitor Mode:  External (Bobbi Irwin RN)

 Monitor Interventions for UA:  Duck Key Adjusted (Bobbi Irwin RN)

 Frequency (min):  X1 (Bobbi Irwin RN)

 Quality:  Mild (Bobbi Irwin RN)

 Duration (sec):  90 (Bobbi Irwin RN)

 Resting Tone (Palpate):  Relaxed (Bobbi Irwin RN)

 Monitor Mode:  External US (Bobbi Irwin RN)

 FHR Baseline Rate :  120 (Bobbi Irwin RN)

 FHR Baseline Changes:  No Baseline Change (Bobbi Irwin RN)

 Variability:  Moderate 6-25 bpm (Bobbi Irwin RN)

 Accelerations:  15X15 (Bobbi Irwin RN)

 Decelerations:  None (Bobbi Irwin RN)

 Level of Consciousness:  Fully Conscious (Bobbi Irwin RN)

 DTR's/Clonus:  DTRs 2+; No Clonus (Bobbi Irwin RN)

 Headache:  Denies (Bobbi Irwin RN)

 Breath Sounds, Left:  Clear and Equal (Bobbi Irwin RN)

 Breath Sounds, Right:  Clear and Equal (Bobbi Irwin RN)

 Nausea/Vomiting:  Denies (Bobbi Irwin RN)

 RUQ Epigastric Pain:  Denies (Bobbi Irwin RN)

 Communication:  RN at Bedside; RN Reviewed Strip (Bobbi Irwin RN)

   

=================================================================

Datetime: 02/07/2017 03:30

=================================================================

   

Stage of Pregnancy:  Antepartum (Bobbi Irwin RN)

 Monitor Mode:  External (Bobbi Irwin RN)

 Frequency (min):  x1 (Bobbi Irwin RN)

 Quality:  Mild/Moderate (Bobbi Irwin RN)

 Duration (sec):  60 (Bobbi Irwin RN)

 Resting Tone (Palpate):  Relaxed (Bobbi Irwin RN)

 Monitor Mode:  External US (Bobbi Irwin RN)

 Monitor Interventions for FHR:  Ultrasound Adjusted (Bobbi Irwin RN)

 FHR Baseline Rate :  125 (Bobbi Irwin RN)

 FHR Baseline Changes:  No Baseline Change (Bobbi Irwin RN)

 Variability:  Moderate 6-25 bpm (Bobbi Irwin RN)

 Accelerations:  10X10 (Bobbi Irwin RN)

 Decelerations:  None (Bobbi Irwin RN)

 Communication:  RN at Bedside; RN Reviewed Strip (Bobbi Irwin RN)

   

=================================================================

Datetime: 02/07/2017 03:00

=================================================================

   

Stage of Pregnancy:  Antepartum (Bobbi Irwin RN)

 Monitor Mode:  External (Bobbi Irwin RN)

 Frequency (min):  x1 (Bobbi Irwin RN)

 Quality:  Mild (Bobbi Irwin RN)

 Duration (sec):  60 (Bobbi Irwin RN)

 Resting Tone (Palpate):  Relaxed (Bobbi Irwin RN)

 Monitor Mode:  External US (Bobbi Irwin RN)

 FHR Baseline Rate :  125 (Bobbi Irwin RN)

 FHR Baseline Changes:  No Baseline Change (Bobbi Irwin RN)

 Variability:  Moderate 6-25 bpm (Bobbi Irwin RN)

 Accelerations:  15X15 (Bobbi Irwin RN)

 Decelerations:  None (Bobbi Irwin RN)

 Pain Presence:  None/Denies (Bobbi Irwin RN)

 Level of Consciousness:  Fully Conscious (Bobbi Irwin RN)

 DTR's/Clonus:  DTRs 2+; No Clonus (Bobbi Irwin RN)

 Headache:  Denies (Bobbi Irwin RN)

 Breath Sounds, Left:  Clear and Equal (Bobbi Irwin RN)

 Breath Sounds, Right:  Clear and Equal (Bobbi Irwin RN)

 Nausea/Vomiting:  Denies (Bobbi Irwin RN)

 RUQ Epigastric Pain:  Denies (Bobbi Irwin RN)

 Patient Position/Activity:  Right Tilt; Semi-Fowlers (Bobbi Irwin RN)

 Communication:  RN at Bedside; RN Reviewed Strip (Bobbi Irwin RN)

 LaborFlag:  Antepartum (QS system process)

   

=================================================================

Datetime: 02/07/2017 02:30

=================================================================

   

Stage of Pregnancy:  Antepartum (Bobbi Irwin RN)

 Monitor Mode:  External (Bobbi Irwin RN)

 Frequency (min):  occ (Bobbi Irwin RN)

 Quality:  Mild (Bobbi Irwin RN)

 Duration (sec):  60-90 (Bobbi Irwin RN)

 Resting Tone (Palpate):  Relaxed (Bobbi Irwin RN)

 Monitor Mode:  External US (Bobbi Irwin RN)

 FHR Baseline Rate :  125 (Bobbi Irwin RN)

 FHR Baseline Changes:  No Baseline Change (Bobbi Irwin RN)

 Variability:  Moderate 6-25 bpm (Bobbi Irwin RN)

 Accelerations:  15X15 (Bobbi Irwin RN)

 Decelerations:  None (Bobbi Irwin RN)

 Communication:  RN Reviewed Strip (Bobbi Errichiello, RN)

   

=================================================================

Datetime: 02/07/2017 02:22

=================================================================

   

 NBP Sys/Berta/Mean (mmHg):  104 (QS system process)

:  62 (QS system process)

:  76 (QS system process)

 Pulse:  86 (QS system process)

 LaborFlag:  Antepartum (QS system process)

   

=================================================================

Datetime: 02/07/2017 02:10

=================================================================

   

 Analgesics/Sedatives:  Ambien 5mg po (Nancy Lattibeaudeir, RN)

 I/O Interventions:  Clear Liquids Given (Nancy Lattibeaudeir, RN)

   

=================================================================

Datetime: 02/07/2017 02:00

=================================================================

   

Stage of Pregnancy:  Antepartum (Bobbi Irwin RN)

 Monitor Mode:  External (Bobbi Irwin RN)

 Frequency (min):  7-8 (Bobbi Irwni RN)

 Quality:  Mild (Bobbi Irwin RN)

 Duration (sec):  60-80 (Bobbi Irwin RN)

 Resting Tone (Palpate):  Relaxed (Bobbi Irwin RN)

 Monitor Mode:  External US (Bobbi Irwin RN)

 Monitor Interventions for FHR:  Ultrasound Adjusted (Bobbi Irwin RN)

 FHR Baseline Rate :  125 (Bobbi Irwin RN)

 FHR Baseline Changes:  No Baseline Change (Bobbi Irwin RN)

 Variability:  Moderate 6-25 bpm (Bobbi Irwin RN)

 Accelerations:  15X15 (Bobbi Irwin RN)

 Decelerations:  None (Bobbi Irwin RN)

 Level of Consciousness:  Fully Conscious (Bobbi Irwin RN)

 DTR's/Clonus:  DTRs 2+; No Clonus (Bobbi Irwin RN)

 Headache:  Denies (Bobbi Irwin RN)

 Breath Sounds, Left:  Clear and Equal (Bobbi Irwin RN)

 Breath Sounds, Right:  Clear and Equal (Bobbi Irwin RN)

 Nausea/Vomiting:  Denies (Bobbi Irwin RN)

 RUQ Epigastric Pain:  Denies (Bobbi Irwin RN)

 Patient Position/Activity:  Right Tilt; Semi-Fowlers (Bobbi Irwin RN)

 Communication:  RN at Bedside; RN Reviewed Strip (Bobbi Irwin RN)

   

=================================================================

Datetime: 02/07/2017 01:30

=================================================================

   

Stage of Pregnancy:  Antepartum (Bobbi Irwin RN)

 Monitor Mode:  External (Bobbi Irwin RN)

 Monitor Interventions for UA:  Duck Key Adjusted (Bobbi Irwin RN)

 Frequency (min):  x2 (Bobbi Irwin RN)

 Quality:  Mild (Bobbi Irwin RN)

 Duration (sec):  40-90 (Bobbi Irwin RN)

 Resting Tone (Palpate):  Relaxed (Bobbi Irwin RN)

 Monitor Mode:  External US (Bobbi Irwin RN)

 FHR Baseline Rate :  125 (Bobbi Irwin RN)

 FHR Baseline Changes:  No Baseline Change (Bobbi Irwin RN)

 Variability:  Moderate 6-25 bpm (Bobbi Irwin RN)

 Accelerations:  15X15 (Bobbi Irwin RN)

 Decelerations:  None (Bobbi Irwin RN)

 Communication:  RN Reviewed Strip (Bobbi Irwin RN)

   

=================================================================

Datetime: 02/07/2017 01:00

=================================================================

   

Stage of Pregnancy:  Antepartum (Bobbi Irwin RN)

 Monitor Mode:  External (Bobbi Irwin RN)

 Frequency (min):  occasional (Bobbi Irwin RN)

 Quality:  Mild (Bobbi Irwin RN)

 Duration (sec):  70-90 (Bobbi Irwin RN)

 Resting Tone (Palpate):  Relaxed (Bobbi Irwin RN)

 Monitor Mode:  External US (Bobbi Irwin RN)

 Monitor Interventions for FHR:  Ultrasound Adjusted (Bobbi Irwin RN)

 FHR Baseline Rate :  125 (Bobbi Irwin RN)

 FHR Baseline Changes:  No Baseline Change (Bobbi Irwin RN)

 Variability:  Moderate 6-25 bpm (Bobbi Irwin RN)

 Accelerations:  15X15 (Bobbi Irwin RN)

 Decelerations:  None (Bobbi Irwin RN)

 Pain Presence:  Intermittent (Bobbi Irwin RN)

 Pain Type:  Contraction (Bobbi Irwin RN)

 Pain Relief Measures:  Comfort Measures (Bobbi Irwin RN)

 Pain Coping:  Talking Through Contractions (Bobbi Irwin RN)

 Level of Consciousness:  Fully Conscious (Bobbi Irwin RN)

 DTR's/Clonus:  DTRs 2+; No Clonus (Bobbi Irwin RN)

 Headache:  Denies (Bobbi Irwin RN)

 Breath Sounds, Left:  Clear and Equal (Bobbi Irwin RN)

 Breath Sounds, Right:  Clear and Equal (Bobbi Irwin RN)

 Nausea/Vomiting:  Denies (Bobbi Irwin RN)

 RUQ Epigastric Pain:  Denies (Bobbi Irwin RN)

 Patient Position/Activity:  Right Tilt; Semi-Fowlers (Bobbi Irwin RN)

 Comfort Measures:  Breathing/Relaxation; Coaching; Family Support

   (Bobbi Irwin RN)

 Communication:  RN at Bedside; RN Reviewed Strip (Bobbi Irwin RN)

 LaborFlag:  Antepartum (QS system process)

   

=================================================================

Datetime: 02/07/2017 00:30

=================================================================

   

Stage of Pregnancy:  Antepartum (Bobbi Irwin RN)

 Monitor Mode:  External (Bobbi Irwin RN)

 Frequency (min):  3-7 (Bobbi Irwin RN)

 Quality:  Mild (Bobbi Irwin RN)

 Duration (sec):  50-70 (Bobbi Irwin RN)

 Resting Tone (Palpate):  Relaxed (Bobbi Irwin RN)

 Monitor Mode:  External US (Bobbi Irwin RN)

 FHR Baseline Rate :  125 (Bobbi Irwin RN)

 FHR Baseline Changes:  No Baseline Change (Bobbi Irwin RN)

 Variability:  Moderate 6-25 bpm (Bobbi Irwin RN)

 Accelerations:  15X15 (Bobbi Irwin RN)

 Decelerations:  None (Bobbi Irwin RN)

 Patient Position/Activity:  Right Tilt; Semi-Fowlers (Bobbi Irwin RN)

 Communication:  RN Reviewed Strip (Bobbi Irwin RN)

   

=================================================================

Datetime: 02/07/2017 00:20

=================================================================

   

 NBP Sys/Berta/Mean (mmHg):  103 (QS system process)

:  58 (QS system process)

:  75 (QS system process)

 Pulse:  88 (QS system process)

 Analgesics/Sedatives:  Ambien (mg) @ 5 (Bobbi Irwin RN)

 LaborFlag:  Antepartum (QS system process)

   

=================================================================

Datetime: 02/07/2017 00:15

=================================================================

   

 Antibiotics:  Penicillin IV (Units) @ [id number] (Bobbi Irwin RN)

   

=================================================================

Datetime: 02/07/2017 00:05

=================================================================

   

 NBP Sys/Berta/Mean (mmHg):  103 (QS system process)

:  56 (QS system process)

:  74 (QS system process)

 Pulse:  90 (QS system process)

 LaborFlag:  Antepartum (QS system process)

   

=================================================================

Datetime: 02/07/2017 00:00

=================================================================

   

Stage of Pregnancy:  Antepartum (Bobbi Irwin RN)

 Monitor Mode:  External (Bobbi Irwin RN)

 Frequency (min):  5-9 (Bobbi Irwin RN)

 Quality:  Mild/Moderate (Bobbi Irwin RN)

 Duration (sec):  50-90 (Bobbi Irwin RN)

 Resting Tone (Palpate):  Relaxed (Bobbi Irwin RN)

 Monitor Mode:  External US (Bobbi Irwin RN)

 FHR Baseline Rate :  125 (Bobbi Irwin RN)

 FHR Baseline Changes:  No Baseline Change (Bobbi Irwin RN)

 Variability:  Moderate 6-25 bpm (Bobbi Irwin RN)

 Accelerations:  15X15 (Bobbi Irwin RN)

 Decelerations:  None (Bobbi Irwin RN)

 Level of Consciousness:  Fully Conscious (Bobbi Irwin RN)

 DTR's/Clonus:  DTRs 2+; No Clonus (Bobbi Irwin RN)

 Headache:  Denies (Bobbi Irwin RN)

 Breath Sounds, Left:  Clear and Equal (Bobbi Irwin RN)

 Breath Sounds, Right:  Clear and Equal (Bobbi Irwin RN)

 Nausea/Vomiting:  Denies (Bobbi Irwin RN)

 RUQ Epigastric Pain:  Denies (Bobbi Irwin RN)

 Patient Position/Activity:  Right Tilt; Semi-Fowlers (Bobbi Irwin RN)

 Communication:  RN at Bedside; RN Reviewed Strip (Bobbi Irwin RN)

   

=================================================================

Datetime: 02/06/2017 23:50

=================================================================

   

 NBP Sys/Berta/Mean (mmHg):  105 (QS system process)

:  58 (QS system process)

:  77 (QS system process)

 Pulse:  95 (QS system process)

 LaborFlag:  Antepartum (QS system process)

   

=================================================================

Datetime: 02/06/2017 23:35

=================================================================

   

 NBP Sys/Berta/Mean (mmHg):  103 (QS system process)

:  55 (QS system process)

:  75 (QS system process)

 Pulse:  87 (QS system process)

 LaborFlag:  Antepartum (QS system process)

   

=================================================================

Datetime: 02/06/2017 23:30

=================================================================

   

Stage of Pregnancy:  Antepartum (Bobbi Irwin RN)

 Respirations:  16 (Bobbi Irwin RN)

 Monitor Mode:  External (Bobbi Irwin RN)

 Frequency (min):  5-6 (Bobbi Irwin RN)

 Quality:  Mild/Moderate (Bobbi Irwin RN)

 Duration (sec):  40-70 (Bobbi Irwin RN)

 Resting Tone (Palpate):  Relaxed (Bobbi Irwin RN)

 Monitor Mode:  External US (Bobbi Irwin RN)

 FHR Baseline Rate :  125 (Bobbi Irwin RN)

 FHR Baseline Changes:  No Baseline Change (Bobbi Irwin RN)

 Variability:  Moderate 6-25 bpm (Bobbi Irwin RN)

 Accelerations:  15X15 (Bobbi Irwin RN)

 Decelerations:  None (Bobbi Irwin RN)

 Pain Presence:  Intermittent (Bobbi Irwin RN)

 Pain Type:  Contraction (Bobbi Irwin RN)

 Pain Location:  Abdomen (Bobbi Irwin RN)

 Pain Relief Measures:  Comfort Measures (Bobbi Irwin RN)

 Pain Coping:  Talking Through Contractions (Bobbi Irwin RN)

 Magnesium/Antihypertensives:  Magnesium Sulfate IV (Gm/hr) @ 2

   (Bobbi Irwin RN)

 Patient Position/Activity:  Right Tilt; Semi-Fowlers (Bobbi Irwin RN)

 Comfort Measures:  Breathing/Relaxation; Coaching; Family Support

   (Bobbi Irwin RN)

 Communication:  RN Reviewed Strip (Bobbi Irwin RN)

 LaborFlag:  Antepartum (QS system process)

   

=================================================================

Datetime: 02/06/2017 23:20

=================================================================

   

 NBP Sys/Berta/Mean (mmHg):  113 (QS system process)

:  59 (QS system process)

:  81 (QS system process)

 Pulse:  101 (QS system process)

 LaborFlag:  Antepartum (QS system process)

   

=================================================================

Datetime: 02/06/2017 23:05

=================================================================

   

 NBP Sys/Berta/Mean (mmHg):  113 (QS system process)

:  59 (QS system process)

:  82 (QS system process)

 Pulse:  94 (QS system process)

 LaborFlag:  Antepartum (QS system process)

   

=================================================================

Datetime: 02/06/2017 23:00

=================================================================

   

Stage of Pregnancy:  Antepartum (Bobbi Irwin RN)

 Monitor Mode:  External (Bobbi Irwin RN)

 Monitor Interventions for UA:  [place] Adjusted (Bobbi Irwin RN)

 Frequency (min):  3-5 (Bobbi Irwin RN)

 Quality:  Mild/Moderate (Bobbi Irwin RN)

 Duration (sec):  50-90 (Bobbi Irwin RN)

 Resting Tone (Palpate):  Relaxed (Bobbi Irwin RN)

 Monitor Mode:  External US (Bobbi Irwin RN)

 Monitor Interventions for FHR:  Ultrasound Adjusted (Bobbi Irwin RN)

 FHR Baseline Rate :  130 (Bobbi Irwin RN)

 FHR Baseline Changes:  No Baseline Change (Bobbi Irwin RN)

 Variability:  Moderate 6-25 bpm (Bobbi Irwin RN)

 Accelerations:  15X15 (Bobbi Irwin RN)

 Decelerations:  None (Bobbi Irwin RN)

 Pain Presence:  Intermittent (Bobbi Irwin RN)

 Pain Type:  Contraction (Bobbi Irwin RN)

 Pain Location:  Abdomen (Bobbi Irwin RN)

 Pain Relief Measures:  Comfort Measures (Bobbi Irwin RN)

 Pain Coping:  Talking Through Contractions (Bobbi Irwin RN)

 Level of Consciousness:  Fully Conscious (Bobbi Irwin RN)

 DTR's/Clonus:  DTRs 2+; No Clonus (Bobbi Irwin RN)

 Headache:  Denies (Bobbi Irwin RN)

 Breath Sounds, Left:  Clear and Equal (Bobbi Irwin RN)

 Breath Sounds, Right:  Clear and Equal (Bobbi Irwin RN)

 Nausea/Vomiting:  Denies (Bobbi Irwin RN)

 RUQ Epigastric Pain:  Denies (Bobbi Irwin RN)

 Magnesium/Antihypertensives:  Magnesium Sulfate IV (Gm/hr) @ 2

   (Bobbi Irwin RN)

 Patient Position/Activity:  Right Tilt; Semi-Fowlers (Bobbi Irwin RN)

 Comfort Measures:  Breathing/Relaxation; Coaching; Family Support

   (Bobbi Irwin RN)

 Communication:  RN at Bedside; RN Reviewed Strip (Bobbi Irwin RN)

 LaborFlag:  Antepartum (QS system process)

   

=================================================================

Datetime: 02/06/2017 22:50

=================================================================

   

 NBP Sys/Berta/Mean (mmHg):  111 (QS system process)

:  55 (QS system process)

:  76 (QS system process)

 Pulse:  103 (QS system process)

 LaborFlag:  Antepartum (QS system process)

   

=================================================================

Datetime: 02/06/2017 22:35

=================================================================

   

 NBP Sys/Berta/Mean (mmHg):  122 (QS system process)

:  59 (QS system process)

:  84 (QS system process)

 Pulse:  94 (QS system process)

 LaborFlag:  Antepartum (QS system process)

   

=================================================================

Datetime: 02/06/2017 22:30

=================================================================

   

Stage of Pregnancy:  Antepartum (Bobbi Irwin RN)

 Monitor Mode:  External (Bobbi Irwin RN)

 Frequency (min):  4-9 (Bobbi Irwin RN)

 Quality:  Mild (Bobbi Irwin RN)

 Duration (sec):  50-90 (Bobbi Irwin RN)

 Resting Tone (Palpate):  Relaxed (Bobbi Irwin RN)

 Monitor Mode:  External US (Bobbi Irwin RN)

 FHR Baseline Rate :  130 (Bobbi Irwin RN)

 FHR Baseline Changes:  No Baseline Change (Bobbi Irwin RN)

 Variability:  Moderate 6-25 bpm (Bobbi Irwin RN)

 Accelerations:  15X15 (Bobbi Irwin RN)

 Decelerations:  None (Bobbi Irwin RN)

 Patient Position/Activity:  Right Tilt; Semi-Fowlers (Bobbi

   Errichiello, RN)

 Communication:  RN Reviewed Strip (Bobbi Murrayichiello, RN)

   

=================================================================

Datetime: 02/06/2017 22:21

=================================================================

   

 Pulse:  109 (QS system process)

 SpO2 (%):  95 (QS system process)

 LaborFlag:  Antepartum (QS system process)

   

=================================================================

Datetime: 02/06/2017 22:20

=================================================================

   

 NBP Sys/Berta/Mean (mmHg):  112 (QS system process)

:  54 (QS system process)

:  78 (QS system process)

 Pulse:  100 (QS system process)

 LaborFlag:  Antepartum (QS system process)

   

=================================================================

Datetime: 02/06/2017 22:16

=================================================================

   

 Pulse:  108 (QS system process)

 Pulse:  107 (QS system process)

 SpO2 (%):  95 (QS system process)

 SpO2 (%):  94 (QS system process)

 LaborFlag:  Antepartum (QS system process)

   

=================================================================

Datetime: 02/06/2017 22:15

=================================================================

   

 Monitor Mode:  External US (Bobbi Irwin RN)

 Monitor Interventions for FHR:  Ultrasound Adjusted (Bobbi Irwin RN)

 FHR Baseline Rate :  135 (Bobbi Irwin RN)

 FHR Baseline Changes:  No Baseline Change (Bobbi Irwin RN)

 Variability:  Moderate 6-25 bpm (Bobbi Irwin RN)

 Accelerations:  15X15 (Bobbi Irwin RN)

 Decelerations:  None (Bobbi Irwin RN)

   

=================================================================

Datetime: 02/06/2017 22:11

=================================================================

   

 Pulse:  108 (QS system process)

 SpO2 (%):  94 (QS system process)

 LaborFlag:  Antepartum (QS system process)

   

=================================================================

Datetime: 02/06/2017 22:07

=================================================================

   

 Pulse:  99 (QS system process)

 SpO2 (%):  94 (QS system process)

 LaborFlag:  Antepartum (QS system process)

   

=================================================================

Datetime: 02/06/2017 22:06

=================================================================

   

 NBP Sys/Berta/Mean (mmHg):  119 (QS system process)

:  57 (QS system process)

:  80 (QS system process)

 Pulse:  106 (QS system process)

 Pulse:  108 (QS system process)

 SpO2 (%):  96 (QS system process)

 LaborFlag:  Antepartum (QS system process)

   

=================================================================

Datetime: 02/06/2017 22:02

=================================================================

   

 Monitor Interventions for UA:  Duck Key Adjusted (Bobbi Irwin RN)

   

=================================================================

Datetime: 02/06/2017 22:01

=================================================================

   

 Pulse:  112 (QS system process)

 SpO2 (%):  97 (QS system process)

 LaborFlag:  Antepartum (QS system process)

   

=================================================================

Datetime: 02/06/2017 22:00

=================================================================

   

Stage of Pregnancy:  Antepartum (Bobbi Irwin RN)

 Pulse:  111 (QS system process)

 SpO2 (%):  94 (QS system process)

 Monitor Mode:  Internal (Bobib Irwin RN)

 Frequency (min):  x2 (Bobbi Irwin RN)

 Quality:  Mild (Bobbi Irwin RN)

 Duration (sec):  60-70 (Bobbi Irwin RN)

 Resting Tone (Palpate):  Relaxed (Bobbi Irwin RN)

 Monitor Mode:  External US (Bobbi Irwin RN)

 Monitor Interventions for FHR:  Ultrasound Adjusted (Bobbi Irwin RN)

 FHR Baseline Rate :  135 (Bobbi Irwin RN)

 FHR Baseline Changes:  No Baseline Change (Bobbi Irwin RN)

 Variability:  Moderate 6-25 bpm (Bobbi Irwin RN)

 Accelerations:  15X15 (Bobbi Irwin RN)

 Decelerations:  None (Bobbi Irwin RN)

 Level of Consciousness:  Fully Conscious (Bobbi Irwin RN)

 DTR's/Clonus:  DTRs 2+; No Clonus (Bobbi Irwin RN)

 Headache:  Denies (Bobbi Irwin RN)

 Breath Sounds, Left:  Clear and Equal (Bobbi Irwin RN)

 Breath Sounds, Right:  Clear and Equal (Bobbi Irwin RN)

 Nausea/Vomiting:  Denies (Bobbi Irwin RN)

 RUQ Epigastric Pain:  Denies (Bobbi Irwin RN)

 Patient Position/Activity:  Right Tilt; Semi-Fowlers (Bobbi Irwin RN)

 Communication:  RN at Bedside; RN Reviewed Strip (Bobbi Irwin RN)

 LaborFlag:  Antepartum (QS system process)

## 2017-02-07 NOTE — L&D FLOW SHEET
=================================================================

LD Flowsheet

=================================================================

Datetime Report Generated by CPN: 02/07/2017 16:00

   

   

=================================================================

Datetime: 02/07/2017 15:58

=================================================================

   

 Temperature (F):  98.6 (Maris Tovar RN)

 Temperature (C):  37.0 (QS system process)

 Temperature Route:  Axillary (Maris Tovar, RN)

 LaborFlag:  Antepartum (QS system process)

   

=================================================================

Datetime: 02/07/2017 15:51

=================================================================

   

 NBP Sys/Berta/Mean (mmHg):  109 (QS system process)

:  58 (QS system process)

:  77 (QS system process)

 Pulse:  105 (QS system process)

 LaborFlag:  Antepartum (QS system process)

   

=================================================================

Datetime: 02/07/2017 15:21

=================================================================

   

 NBP Sys/Berta/Mean (mmHg):  126 (QS system process)

:  58 (QS system process)

:  84 (QS system process)

 Pulse:  101 (QS system process)

 LaborFlag:  Antepartum (QS system process)

   

=================================================================

Datetime: 02/07/2017 15:15

=================================================================

   

 Monitor Mode:  External; Palpation (Maris Tovar RN)

 Quality:  Mild (Maris Tovar RN)

 Duration Criteria:  Less than Two 120 Second Contractions (Maris Tovar RN)

 Pattern:  Normal: <= 5 Contractions in 10 Minutes (Maris Tovar RN)

 Resting Tone (Palpate):  Relaxed (Maris Tovar RN)

 Contraction Comments:  irritability (Maris Tovar RN)

 Monitor Mode:  External US (Maris Tovar RN)

 FHR Baseline Rate :  130 (Maris Tovar RN)

 FHR Baseline Changes:  No Baseline Change (Maris Tovar RN)

 Variability:  Moderate 6-25 bpm (Maris Tovar RN)

 Accelerations:  None (Maris Tovar RN)

 Decelerations:  None (Maris Tovar RN)

   

=================================================================

Datetime: 02/07/2017 15:00

=================================================================

   

 Monitor Mode:  External; Palpation (Maris Tovar RN)

 Quality:  Mild (Maris Tovar RN)

 Duration Criteria:  Less than Two 120 Second Contractions (Maris Tovar RN)

 Pattern:  Normal: <= 5 Contractions in 10 Minutes (Maris Tovar RN)

 Resting Tone (Palpate):  Relaxed (Maris Tovar RN)

 Contraction Comments:  irritability (Maris Tovar RN)

 Monitor Mode:  External US (Maris Tovar RN)

 FHR Baseline Rate :  130 (Maris Tovar RN)

 FHR Baseline Changes:  No Baseline Change (Maris Tovar RN)

 Variability:  Moderate 6-25 bpm (Maris Tovar RN)

 Accelerations:  10X10 (Maris Tovar RN)

 Decelerations:  None (Maris Tovar RN)

 Level of Consciousness:  Fully Conscious (Maris Tovar RN)

 DTR's/Clonus:  DTRs 2+; No Clonus (Maris Tovar RN)

 Headache:  Denies (Maris Tovar RN)

 Breath Sounds, Left:  Clear and Equal (Maris Tovar RN)

 Breath Sounds, Right:  Clear and Equal (Maris Tovar RN)

 Nausea/Vomiting:  Denies (Maris Tovar, VIDYA)

 RUQ Epigastric Pain:  Denies (Maris Tovar RN)

 Medication Comments:  New bag magnesium sulfate hung at 2grams/hour

   (Maris Tovar RN)

   

=================================================================

Datetime: 02/07/2017 14:51

=================================================================

   

 NBP Sys/Berta/Mean (mmHg):  115 (QS system process)

:  56 (QS system process)

:  78 (QS system process)

 Pulse:  112 (QS system process)

 LaborFlag:  Antepartum (QS system process)

   

=================================================================

Datetime: 02/07/2017 14:45

=================================================================

   

 Monitor Mode:  External; Palpation (Maris Tovar RN)

 Frequency (min):  x1 (Maris Tovar RN)

 Quality:  Mild (Maris Tovar RN)

 Duration (sec):  90 (Maris Tovar, VIDYA)

 Duration Criteria:  Less than Two 120 Second Contractions (Maris Tovar, RN)

 Pattern:  Normal: <= 5 Contractions in 10 Minutes (Maris Toavr RN)

 Resting Tone (Palpate):  Relaxed (Maris Tovar RN)

 Monitor Mode:  External US (Maris Tovar RN)

 FHR Baseline Rate :  135 (Maris Tovar RN)

 FHR Baseline Changes:  No Baseline Change (Maris Tovar RN)

 Variability:  Moderate 6-25 bpm (Maris Tovar, VIDYA)

 Accelerations:  15X15 (Maris Tovar, RN)

 Decelerations:  None (Maris Tovar, RN)

   

=================================================================

Datetime: 02/07/2017 14:30

=================================================================

   

 Monitor Mode:  External; Palpation (Maris Tovar RN)

 Quality:  Mild (Maris Tovar, RN)

 Resting Tone (Palpate):  Relaxed (Maris Tovar, RN)

 Contraction Comments:  irritability (Maris Tovar, RN)

 Monitor Mode:  External US (Maris Tovar, RN)

 FHR Baseline Rate :  135 (Maris Tovar RN)

 FHR Baseline Changes:  No Baseline Change (Maris Tovar, RN)

 Variability:  Moderate 6-25 bpm (Maris Tovar, RN)

 Accelerations:  None (Maris Tovar, RN)

 Decelerations:  None (Maris Tovar, RN)

   

=================================================================

Datetime: 02/07/2017 14:21

=================================================================

   

 NBP Sys/Berta/Mean (mmHg):  115 (QS system process)

:  53 (QS system process)

:  77 (QS system process)

 Pulse:  108 (QS system process)

 LaborFlag:  Antepartum (QS system process)

   

=================================================================

Datetime: 02/07/2017 14:15

=================================================================

   

 Monitor Mode:  External; Palpation (Maris Tovar, RN)

 Quality:  Mild (Maris Tovar, RN)

 Resting Tone (Palpate):  Relaxed (Maris Guy, RN)

 Contraction Comments:  irritability (Maris Tovar, RN)

 Monitor Mode:  External US (Maris Tovar, RN)

 FHR Baseline Rate :  130 (Marislianne Tovar, RN)

 FHR Baseline Changes:  No Baseline Change (Maris Guy, RN)

 Variability:  Moderate 6-25 bpm (Maris Guy, RN)

 Accelerations:  None (Maris Guy, RN)

 Decelerations:  None (Maris Guy, RN)

   

=================================================================

Datetime: 02/07/2017 14:06

=================================================================

   

 Dilatation (cm):  3.0 (Maris Tovar, RN)

 Effacement (%):  50 (Maris Tovar, RN)

 Station:  -3 (Maris Tovar, RN)

 Exam by:  Dr. Araujo (Maris Tovar, RN)

   

=================================================================

Datetime: 02/07/2017 14:04

=================================================================

   

 Communication Comments:  Dr. Araujo at bedside discussing POC (Maris Tovar RN)

   

=================================================================

Datetime: 02/07/2017 14:00

=================================================================

   

 Monitor Mode:  External; Palpation (Maris Tovar RN)

 Quality:  Mild (Maris Tovar RN)

 Resting Tone (Palpate):  Relaxed (Maris Tovar RN)

 Contraction Comments:  irritability (Maris Tovar RN)

 Monitor Mode:  External US (Maris Tovar RN)

 FHR Baseline Rate :  125 (Maris Tovar RN)

 FHR Baseline Changes:  No Baseline Change (Maris Tovar RN)

 Variability:  Moderate 6-25 bpm (Maris Tovar RN)

 Accelerations:  15X15 (Maris Tovar RN)

 Decelerations:  None (Maris Tovar RN)

 Level of Consciousness:  Fully Conscious (Maris Tovar RN)

 DTR's/Clonus:  DTRs 2+; No Clonus (Maris Tovar RN)

 Headache:  Denies (Maris Tovar RN)

 Breath Sounds, Left:  Clear and Equal (Maris Tovar RN)

 Breath Sounds, Right:  Clear and Equal (Maris Tovar RN)

 Nausea/Vomiting:  Denies (Maris Tovar RN)

 RUQ Epigastric Pain:  Denies (Maris Tovar RN)

## 2017-02-08 NOTE — L&D FLOW SHEET
=================================================================

LD Flowsheet

=================================================================

Datetime Report Generated by CPN: 02/08/2017 10:00

   

   

=================================================================

Datetime: 02/08/2017 08:43

=================================================================

   

 Communication Comments:  orders received from Dr. Millan for Procardia

   10 mg PO q6h (Maris Tovar, RN)

   

=================================================================

Datetime: 02/08/2017 08:15

=================================================================

   

 Monitor Mode:  External; Palpation (Maris Tovar RN)

 Frequency (min):  x2 (Maris Tovar RN)

 Quality:  Mild (Maris Tovar RN)

 Duration (sec):  50-60 (Maris Tovar RN)

 Duration Criteria:  Less than Two 120 Second Contractions (Maris Tovar RN)

 Pattern:  Normal: <= 5 Contractions in 10 Minutes (Maris Tovar RN)

 Resting Tone (Palpate):  Relaxed (Maris Tovar RN)

 Monitor Mode:  External US (Maris Tovar RN)

 FHR Baseline Rate :  130 (Maris Tovar RN)

 FHR Baseline Changes:  No Baseline Change (Maris Tovar RN)

 Variability:  Moderate 6-25 bpm (Maris Tovar RN)

 Accelerations:  15X15 (Maris Tovar RN)

 Decelerations:  None (Maris Tovar, VIDYA)

   

=================================================================

Datetime: 02/08/2017 08:00

=================================================================

   

 Monitor Mode:  External; Palpation (Jeanine Camp, RNC)

 Frequency (min):  irregular (Jeanine Camp, RNC)

 Quality:  Mild (Jeanine Camp, RNC)

 Duration (sec):  30-40 (Jeanine Camp, RNC)

 Pattern:  Normal: <= 5 Contractions in 10 Minutes (Jeanine Camp, RNC)

 Resting Tone (Palpate):  Relaxed (Jeanine Camp, RNC)

 Monitor Mode:  External US; Auscultation (Jeanine Camp, RNC)

 FHR Baseline Rate :  120 (Jeanine Camp, RNC)

 FHR Baseline Changes:  No Baseline Change (Jeanine Camp, RNC)

 Variability:  Moderate 6-25 bpm (Jeanine Camp, RNC)

 Accelerations:  15X15 (Jeanine Camp, RNC)

 Decelerations:  None (Jeanine Camp, RNC)

 Level of Consciousness:  Fully Conscious (Maris Tovar RN)

 DTR's/Clonus:  DTRs 2+; No Clonus (Maris Tovar RN)

 Headache:  Denies (Maris Tovar RN)

 Breath Sounds, Left:  Clear and Equal (Maris Tovar RN)

 Breath Sounds, Right:  Clear and Equal (Maris Tovar RN)

 Nausea/Vomiting:  Denies (Maris Tovar RN)

 RUQ Epigastric Pain:  Denies (Maris Tovar RN)

## 2017-02-08 NOTE — L&D FLOW SHEET
=================================================================

LD Flowsheet

=================================================================

Datetime Report Generated by CPN: 2017 08:00

   

   

=================================================================

Datetime: 2017 07:45

=================================================================

   

 Magnesium/Antihypertensives:  Magnesium Sulfate Discontinued (Jeanine

   Camp, RNC)

   

=================================================================

Datetime: 2017 07:42

=================================================================

   

 Provider Reviewed Strip:  Yes (Jeanine Camp, RNC)

 Communication:  Provider Orders Received (Jeanine Camp, RNC)

 Communication Comments:  Dr Millan on unit chart, history and strip

   reveiwed. Order received to discontinue mag sulfate  (Jeanine Camp,

   RNC)

   

=================================================================

Datetime: 2017 07:30

=================================================================

   

 Monitor Mode:  External; Palpation (Jeanine Camp, RNC)

 Frequency (min):  irregular (Jeanine Camp, RNC)

 Quality:  Mild (Jeanine Camp, RNC)

 Duration (sec):  40-50 (Jeanine Camp, RNC)

 Pattern:  Normal: <= 5 Contractions in 10 Minutes (Jeanine Camp, RNC)

 Resting Tone (Palpate):  Relaxed (Jeanine Camp, RNC)

 Monitor Mode:  External US; Auscultation (Jeanine Camp, RNC)

 FHR Baseline Rate :  130 (Jeanine Camp, RNC)

 FHR Baseline Changes:  No Baseline Change (Jeanine Camp, RNC)

 Variability:  Moderate 6-25 bpm (Jeanine Camp, RNC)

 Accelerations:  15X15 (Jeanine Camp, RNC)

 Decelerations:  None (Jeanine Camp, RNC)

   

=================================================================

Datetime: 2017 07:20

=================================================================

   

 Communication Comments:  Bedside report to ZARA Lazo RN, care

   relinquished  (Rere Fox RN)

   

=================================================================

Datetime: 2017 07:00

=================================================================

   

 Monitor Mode:  External (Rere Dominique, RN)

 Frequency (min):  6-9 (Rere Dominique, RN)

 Quality:  Mild (Rere Dominique, RN)

 Duration (sec):  50-90 (Rere Dominique, RN)

 Resting Tone (Palpate):  Relaxed (Rere Dominique, RN)

 Monitor Mode:  External US (Rere Dominique, RN)

 FHR Baseline Rate :  120 (Rere Dominique, RN)

 Variability:  Moderate 6-25 bpm (Rere Dominique, RN)

 Accelerations:  15X15 (Rere Dominique, RN)

 Decelerations:  None (Rere Dominique, RN)

 Level of Consciousness:  Fully Conscious (Maris Guy, RN)

 DTR's/Clonus:  DTRs 2+; No Clonus (Maris Guy, RN)

 Headache:  Denies (Maris Guy, RN)

 Breath Sounds, Left:  Clear and Equal (Maris Guy, RN)

 Breath Sounds, Right:  Clear and Equal (Maris Guy, RN)

 Nausea/Vomiting:  Denies (Maris Guy, RN)

 RUQ Epigastric Pain:  Denies (Maris Guy, RN)

   

=================================================================

Datetime: 2017 06:47

=================================================================

   

 Quality:  Mild (Rere Dominique, RN)

 Pain Scale:  1 (Rere Dominique, RN)

 Pain Presence:  Intermittent (Rere Dominique, RN)

 Pain Type:  Contraction (Rere Dominique, RN)

 Pain Location:  Abdomen (Rere Campbellsel, RN)

 Pain Assessment Comments:  Pt reports ctx since she woke up. States

   they are mild, rates them 1/5 on pain scale  (Rere Dominique, RN)

 Communication:  RN at Bedside (Rere Fox RN)

 LaborFlag:  Antepartum (QS system process)

   

=================================================================

Datetime: 2017 06:30

=================================================================

   

 Monitor Mode:  External (Rere Dominique, RN)

 Frequency (min):  x1 (Rere Dominique, RN)

 Quality:  Mild (Rere Dominique, RN)

 Duration (sec):  80 (Rere Dominique, RN)

 Resting Tone (Palpate):  Relaxed (Rere Dominique, RN)

 Monitor Mode:  External US (Rere Dominique, RN)

 FHR Baseline Rate :  120 (Rere Dominique, RN)

 Variability:  Moderate 6-25 bpm (Rere Dominique, RN)

 Accelerations:  15X15 (Rere Dominique, RN)

 Decelerations:  None (Rere Dominique, RN)

   

=================================================================

Datetime: 2017 06:17

=================================================================

   

 NBP Sys/Berta/Mean (mmHg):  121 (QS system process)

:  58 (QS system process)

:  77 (QS system process)

 Pulse:  86 (QS system process)

 Respirations:  15 (Rere Fox RN)

 Temperature (F):  98.8 (Rere Fox RN)

 Temperature (C):  37.1 (QS system process)

 Pain Scale:  0 (Rere Fox RN)

 Instructional Method:  Verbal; Patient Instructed; Family/Support

   Person Instructed; Verbalized Understanding (Rere Dominique, RN)

 Plan of Care:  Plan of Care Discussed; Vaginal Delivery; Labor;

    Labor (Rere Fox RN)

 PTL/PROM:  NICU Information; Expected Outcomes (Rere Fox RN)

 LaborFlag:  Antepartum (QS system process)

   

=================================================================

Datetime: 2017 06:10

=================================================================

   

 Antibiotics:  Penicillin IV (Units) @ (Annotations: 2,500,000) (Rere Fox RN)

   

=================================================================

Datetime: 2017 06:00

=================================================================

   

 Contraction Comments:  pt denies ctx  (Rere Dominique, RN)

 Level of Consciousness:  Fully Conscious (Rere Dominique, RN)

 DTR's/Clonus:  DTRs 2+; No Clonus (Rere Dominique, RN)

 Headache:  Denies (Rere Dominique, RN)

 Breath Sounds, Left:  Clear and Equal (Rere Dominique, RN)

 Breath Sounds, Right:  Clear and Equal (Rere Dominique, RN)

 Nausea/Vomiting:  Denies (Rere Dominique, RN)

 RUQ Epigastric Pain:  Denies (Rere Dominique, RN)

   

=================================================================

Datetime: 2017 05:00

=================================================================

   

 Contraction Comments:  pt denies ctx  (Rere Dominique, RN)

 Level of Consciousness:  Fully Conscious (Rere Dominique, RN)

 DTR's/Clonus:  DTRs 2+; No Clonus (Rere Dominique, RN)

 Headache:  Denies (Rere Dominique, RN)

 Breath Sounds, Left:  Clear and Equal (Rere Dominique, RN)

 Breath Sounds, Right:  Clear and Equal (Rere Dominique, RN)

 Nausea/Vomiting:  Denies (Rere Dominique, RN)

 RUQ Epigastric Pain:  Denies (Rere Dominique, RN)

   

=================================================================

Datetime: 2017 04:00

=================================================================

   

 Contraction Comments:  pt denies ctx  (Rere Dominique, RN)

 Level of Consciousness:  Fully Conscious (Rere Dominique, RN)

 DTR's/Clonus:  DTRs 1+; No Clonus (Rere Dominique, RN)

 Headache:  Denies (Rere Dominique, RN)

 Nausea/Vomiting:  Denies (Rere Dominique, RN)

 RUQ Epigastric Pain:  Denies (Rere Dominique, RN)

   

=================================================================

Datetime: 2017 03:00

=================================================================

   

 Contraction Comments:  pt denies ctx  (Rere Dominique, RN)

 Level of Consciousness:  Fully Conscious (Rere Dominique, RN)

 DTR's/Clonus:  DTRs 1+; No Clonus (Rere Dominique, RN)

 Headache:  Denies (Rere Dominique, RN)

 Nausea/Vomiting:  Denies (Rere Dominique, RN)

 RUQ Epigastric Pain:  Denies (Rere Dominique, RN)

   

=================================================================

Datetime: 2017 02:11

=================================================================

   

 Antibiotics:  Penicillin IV (Units) @ (Annotations: 2,500,000) (Rere

   Dominique, RN)

   

=================================================================

Datetime: 2017 02:00

=================================================================

   

 Contraction Comments:  Pt denies ctx  (Rere Dominique, RN)

 Level of Consciousness:  Fully Conscious (Rere Dominique, RN)

 DTR's/Clonus:  DTRs 1+; No Clonus (Rere Dominique, RN)

 Headache:  Denies (Rere Dominique, RN)

 Nausea/Vomiting:  Denies (Rere Dominique, RN)

 RUQ Epigastric Pain:  Denies (Rere Dominique, RN)

   

=================================================================

Datetime: 2017 01:00

=================================================================

   

 Contraction Comments:  Pt denies ctx  (Rere Dominique, RN)

 Level of Consciousness:  Fully Conscious (Rere Dominique, RN)

 DTR's/Clonus:  DTRs 1+; No Clonus (Rere Dominique, RN)

 Headache:  Denies (Rere Dominique, RN)

 Breath Sounds, Left:  Clear and Equal (Rere Dominique, RN)

 Breath Sounds, Right:  Clear and Equal (Rere Dominique, RN)

 Nausea/Vomiting:  Denies (Rere Dominique, RN)

 RUQ Epigastric Pain:  Denies (Rere Dominique, RN)

   

=================================================================

Datetime: 2017 00:47

=================================================================

   

 Magnesium/Antihypertensives:  Magnesium Sulfate IV (Gm/hr) @

   (Annotations: 2) (Rere Dominique, RN)

   

=================================================================

Datetime: 2017 00:00

=================================================================

   

 Temperature (F):  98.8 (Rere Dominique, RN)

 Temperature (C):  37.1 (QS system process)

 Temperature Route:  Oral (Rere Dominique, RN)

 Contraction Comments:  Pt denies ctx  (Rere Dominique, RN)

 Level of Consciousness:  Fully Conscious (Rere Dominique, RN)

 DTR's/Clonus:  DTRs 1+; No Clonus (Rere Dominique, RN)

 Headache:  Denies (Rere Doimnique, RN)

 Nausea/Vomiting:  Denies (Rere Dominique, RN)

 RUQ Epigastric Pain:  Denies (Rere Dominique, RN)

 LaborFlag:  Antepartum (QS system process)

   

=================================================================

Datetime: 2017 23:00

=================================================================

   

 Contraction Comments:  Pt denies ctx  (Rere Dominique, RN)

 Level of Consciousness:  Fully Conscious (Rere Dominique, RN)

 DTR's/Clonus:  DTRs 1+; No Clonus (Rere Dominique, RN)

 Headache:  Denies (Rere Dominique, RN)

 Nausea/Vomiting:  Denies (Rere Dominique, RN)

 RUQ Epigastric Pain:  Denies (Rere Dominique, RN)

   

=================================================================

Datetime: 2017 22:00

=================================================================

   

 NBP Sys/Berta/Mean (mmHg):  91 (QS system process)

:  50 (QS system process)

:  66 (QS system process)

 Pulse:  90 (QS system process)

 Contraction Comments:  Pt denies ctx  (Rere Fox RN)

 Level of Consciousness:  Fully Conscious (Rere Fox RN)

 DTR's/Clonus:  DTRs 1+; No Clonus (Rere Fox RN)

 Headache:  Denies (Rere Fox RN)

 Antibiotics:  Penicillin IV (Units) @ (Annotations: 2,500,000 units )

   (Rere Fox RN)

 Medication Comments:  ambien 10 mg PO per [facility] standing orders  (Rere Fox RN)

 LaborFlag:  Antepartum (QS system process)

## 2017-02-08 NOTE — NON STRESS TEST REPORT
=================================================================

Non Stress Test

=================================================================

Datetime Report Generated by CPN: 02/08/2017 12:06

   

   

=================================================================

DEMOGRAPHIC

=================================================================

   

EGA NST:  32.0

   

=================================================================

INDICATION

=================================================================

   

Indication for Study:  Ordered by Provider

   

=================================================================

MONITORING

=================================================================

   

Monitor Explained:  Monitor Explained; Test Explained; Patient

   Verbalized Understanding

Time on Monitor:  02/06/2017 15:08

Time off Monitor:  02/08/2017 11:47

NST Duration:  2679

   

=================================================================

NST INTERVENTIONS

=================================================================

   

NST Interventions:  PO Hydration; Reposition Patient

Physician Notified NST:  Dr. Millan

BABY A:  [id number]

   

=================================================================

BABY A

=================================================================

   

Fetal Movement :  Present

Contraction Frequency :  irregular

FHR Baseline :  125

Accelerations :  15X15

Decelerations :  None

Variability :  Moderate 6-25bpm

NST Review:  Meets Criteria for Reactive NST

NST Review and Verified By :  Jeanine Fredi RNC

NST Results:  Reactive

   

=================================================================

NST REPORT

=================================================================

   

Report Trigger:  Send Report

## 2017-02-08 NOTE — L&D FLOW SHEET
=================================================================

LD Flowsheet

=================================================================

Datetime Report Generated by CPN: 02/08/2017 12:00

   

   

=================================================================

Datetime: 02/08/2017 11:46

=================================================================

   

 NBP Sys/Berta/Mean (mmHg):  99 (QS system process)

:  59 (QS system process)

:  75 (QS system process)

 Pulse:  83 (QS system process)

 LaborFlag:  Antepartum (QS system process)

   

=================================================================

Datetime: 02/08/2017 11:42

=================================================================

   

 Communication Comments:  Dr. Millan at bedside discussing plan of care.

   RN at bedside. Orders received for patient discharge (Maris

   Guy, RN)

   

=================================================================

Datetime: 02/08/2017 11:38

=================================================================

   

 Dilatation (cm):  3.0 (Maris Tovar, VIDYA)

 Effacement (%):  50 (Maris Tovar RN)

 Station:  -3 (Maris Tovar RN)

 Exam by:  Dr. Millan (Maris Tovar, VIDYA)

   

=================================================================

Datetime: 02/08/2017 11:33

=================================================================

   

 NBP Sys/Berta/Mean (mmHg):  94 (QS system process)

:  51 (QS system process)

:  69 (QS system process)

 Pulse:  75 (QS system process)

 LaborFlag:  Antepartum (QS system process)

   

=================================================================

Datetime: 02/08/2017 11:03

=================================================================

   

 NBP Sys/Berta/Mean (mmHg):  99 (QS system process)

:  55 (QS system process)

:  74 (QS system process)

 Pulse:  76 (QS system process)

 LaborFlag:  Antepartum (QS system process)

   

=================================================================

Datetime: 02/08/2017 10:33

=================================================================

   

 NBP Sys/Berta/Mean (mmHg):  99 (QS system process)

:  56 (QS system process)

:  74 (QS system process)

 Pulse:  84 (QS system process)

 LaborFlag:  Antepartum (QS system process)

   

=================================================================

Datetime: 02/08/2017 10:09

=================================================================

   

 Communication Comments:  order received from Dr. Millan to discontinue

   FHR monitor and discontinue Penicillin (Maris Tovar, RN)

   

=================================================================

Datetime: 02/08/2017 10:03

=================================================================

   

 NBP Sys/Berta/Mean (mmHg):  104 (QS system process)

:  55 (QS system process)

:  72 (QS system process)

 Pulse:  78 (QS system process)

 LaborFlag:  Antepartum (QS system process)

   

=================================================================

Datetime: 02/08/2017 10:00

=================================================================

   

 Communication Comments:  Carlson catheter removed per protocol (Maris Tovar RN)

## 2017-02-09 NOTE — ADMISSION PHYSICAL
=================================================================



=================================================================

Datetime Report Generated by DANISH: 2017 13:15

   

Chief Complaint:  Uterine Contractions

Indication for Induction:  Not Applicable

Admit Plan:  Admit to Unit; Initiate  Labor Protocol

General:  Normal

HEENT:  Normal

Neurologic:  Normal

Thyroid:  Normal

Heart:  Normal

Lungs:  Normal

Breast:  Deferred

Back:  Normal

Abdomen:  Normal

Genitourinary Exam:  Normal

Extremities:  Normal

DTRs:  Normal

Pelvic Type:  Adequate

Vital Signs:  Reviewed; Within Normal Limits

Dilatation:  4

Effacement:  50

Station:  -3

Contraction Comments:  q 2-3 minutes

Membranes:  Intact

Monitoring:  External US

Accelerations:  15X15

Decelerations:  None

FHR Category:  Category I

Fetal Presentation:  Vertex

Admit Comment:  22yo  at 32+0ega presents from the office for ctx

   and spotting after intercourse last pm.  She reports mild lower abd

   cramping but is ctx q 2-3 minutes.  Cvx reported in the office as

   1-2/th/oop.  Cvx on my ck 3.5/50/post/soft/-3.  PCN for GBS prophy. 

   Mag for Neuroprotection.  BMZ for FLM.  will continue to monitor

   overnight.  deliver for unstoppable  labor.  Prior term

   delivery at 40+6ega (baby 9#1oz).  Admit for poss  labor. 

   Will re-evaluate cvx as needed.

Informed Consent Obtained:  Vaginal Delivery; Risks, Benefits and

   Alternatives Discussed

## 2017-02-09 NOTE — ADMISSION PHYSICAL
=================================================================



=================================================================

Datetime Report Generated by CPN: 2017 13:20

   

   

=================================================================

CURRENT ADMISSION

=================================================================

   

Chief Complaint:  Uterine Contractions

Indication for Induction:  Not Applicable

Admit Plan:  Admit to Unit; Initiate  Labor Protocol

   

=================================================================

ALLERGIES

=================================================================

   

Medication Allergies:  No

Medication Allergies:  No Known Allergies (2017)

Medication Allergies:  No Known Allergies (10/10/2016)

Latex:  No Latex Allergies

Food Allergies:  denies

Environmental Allergies:  denies

   

=================================================================

OBSTETRICAL HISTORY

=================================================================

   

EDC:  2017 00:00

:  2

Para:  1

Gestational Diabetes:  No

Rh Sensitization:  No

Incompetent Cervix:  No

CARLOS EDUARDO:  No

Infertility:  No

ART Treatment:  No

Uterine Anomaly:  No

IUGR:  No

Hx Previous C/S:  No

Macrosomia:  No

Hx Loss/Stillborn:  No

PIH:  No

Hx  Death:  No

Placenta Previa/Abruption:  No

Depression/PP Depression:  No

PTL/PROM:  No

Post Partum Hemorrhage:  No

Current Pregnancy Procedures:  Ultrasound; NST

   

=================================================================

***SEE PRENATAL RECORDS***

=================================================================

   

Alcohol:  No

Marijuana :  No

Cocaine:  No

Other Illicit Drugs:  No

Cigarettes:  Never Smoker. [id number]

   

=================================================================

MEDICAL HISTORY

=================================================================

   

Diabetes:  No

Blood Transfusion:  No

Pulmonary Disease (Asthma, TB):  No

Breast Disease:  No

Hypertension:  No

Gyn Surgery:  No

Heart Disease:  No

Hosp/Surgery:  Yes

Autoimmune Disorder:  No

Anesthetic Complications:  No

Kidney Disease:  No

Abnormal Pap Smear:  No

Neuro/Epilepsy:  No

Psychiatric Disorders:  No

Hepatitis/Liver Disease:  No

Significant Family History:  No

Varicosities/Phlebitis:  No

   

=================================================================

INFECTIOUS HISTORY

=================================================================

   

Gonorrhea:  No

Genital Herpes:  No

Chlamydia:  No

Tuberculosis:  No

Syphilis:  No

Hepatitis:  No

HIV/AIDS Exposure:  No

Rash or Viral Illness:  No

HPV:  No

   

=================================================================

PHYSICAL EXAM

=================================================================

   

General:  Normal

HEENT:  Normal

Neurologic:  Normal

Thyroid:  Normal

Heart:  Normal

Lungs:  Normal

Breast:  Deferred

Back:  Normal

Abdomen:  Normal

Genitourinary Exam:  Normal

Extremities:  Normal

DTRs:  Normal

Pelvic Type:  Adequate

Vital Signs:  Reviewed; Within Normal Limits

   

=================================================================

VAGINAL EXAM

=================================================================

   

Dilatation:  4

Effacement:  50

Station:  -3

Contraction Comments:  q 2-3 minutes

   

=================================================================

MEMBRANES

=================================================================

   

Membranes:  Intact

   

=================================================================

FETUS A

=================================================================

   

Monitoring:  External US

Accelerations:  15X15

Decelerations:  None

FHR Category:  Category I

Fetal Presentation:  Vertex

Admit Comment:  22yo  at 32+0ega presents from the office for ctx

   and spotting after intercourse last pm.  She reports mild lower abd

   cramping but is ctx q 2-3 minutes.  Cvx reported in the office as

   1-2/th/oop.  Cvx on my ck 3.5/50/post/soft/-3.  PCN for GBS prophy. 

   Mag for Neuroprotection.  BMZ for FLM.  will continue to monitor

   overnight.  deliver for unstoppable  labor.  Prior term

   delivery at 40+6ega (baby 9#1oz).  Admit for poss  labor. 

   Will re-evaluate cvx as needed.

   

=================================================================

PLANS FOR LABOR AND DELIVERY

=================================================================

   

Labor and Delivery:  None

Pain Management:  Medications

Feeding Preference:  Breast

Benefit of Breast Feed Discussed:  Yes

Circumcision:  N/A

   

=================================================================

INFORMED CONSENT

=================================================================

   

Informed Consent Obtained:  Vaginal Delivery; Risks, Benefits and

   Alternatives Discussed

Signature:  Electronically signed by Eugenie Mortensen MD ([email or web address]) on

   2017 at 18:24  with User ID: [email or web address]

## 2017-02-11 NOTE — NON STRESS TEST REPORT
=================================================================

Non Stress Test

=================================================================

Datetime Report Generated by CPN: 02/11/2017 10:55

   

   

=================================================================

DEMOGRAPHIC

=================================================================

   

EGA NST:  32.5

   

=================================================================

INDICATION

=================================================================

   

Indication for Study:  Ordered by Provider

Indication for Study (NST) Other:  UC's

   

=================================================================

MONITORING

=================================================================

   

Monitor Explained:  Monitor Explained; Test Explained; Patient

   Verbalized Understanding

Time on Monitor:  02/11/2017 06:27

Time off Monitor:  02/11/2017 10:01

NST Duration:  214

   

=================================================================

NST INTERVENTIONS

=================================================================

   

NST Interventions:  None

Physician Notified NST:  Dr Millan

   

=================================================================

BABY A

=================================================================

   

Fetal Movement :  Present

Contraction Frequency :  2-9/none

FHR Baseline :  140

Accelerations :  15X15

Decelerations :  None

Variability :  Moderate 6-25bpm

NST Review:  Meets Criteria for Reactive NST

NST Review and Verified By :  HARRIET Tobin RN

NST Results:  Reactive

   

=================================================================

NST REPORT

=================================================================

   

Report Trigger:  Send Report

## 2017-02-11 NOTE — L&D FLOW SHEET
=================================================================

LD Flowsheet

=================================================================

Datetime Report Generated by CPN: 02/11/2017 08:00

   

   

=================================================================

Datetime: 02/11/2017 07:46

=================================================================

   

 NBP Sys/Berta/Mean (mmHg):  122 (QS system process)

:  67 (QS system process)

:  88 (QS system process)

 Pulse:  86 (QS system process)

 LaborFlag:  Antepartum (QS system process)

   

=================================================================

Datetime: 02/11/2017 07:44

=================================================================

   

 Tocolytics:  Terbutaline 0.25mg Subcutaneous-Pulse Less than 120

   (Regla Rey RN)

   

=================================================================

Datetime: 02/11/2017 07:42

=================================================================

   

 NBP Sys/Berta/Mean (mmHg):  132 (QS system process)

:  69 (QS system process)

:  94 (QS system process)

 Pulse:  91 (QS system process)

 LaborFlag:  Antepartum (QS system process)

   

=================================================================

Datetime: 02/11/2017 07:27

=================================================================

   

 Pain Scale:  4 (Regla Rey RN)

 Pain Presence:  Intermittent (Regla Rey RN)

 Pain Type:  Burning; Contraction (Regla Rey RN)

 Pain Location:  Abdomen (Regla Rey RN)

 Notification Reason:  Fetal Status; Labor Status; Membrane Status;

   Uterine Activity; Pain (Regla Rey RN)

 Communication Comments:  Dr Millan notified of pt complaint and hx,

   orders received for terbutaline. (Regla Rey RN)

 LaborFlag:  Antepartum (QS system process)

   

=================================================================

Datetime: 02/11/2017 07:26

=================================================================

   

 NBP Sys/Berta/Mean (mmHg):  119 (QS system process)

:  63 (QS system process)

:  85 (QS system process)

 Pulse:  83 (QS system process)

 LaborFlag:  Antepartum (QS system process)

   

=================================================================

Datetime: 02/11/2017 07:16

=================================================================

   

 Communication Comments:  handoff report recieved from [facility] RN

   (Regla Baidy, RN)

   

=================================================================

Datetime: 02/11/2017 07:12

=================================================================

   

 Communication:  Report Given to @ B.Baidy, RN; care relinquished at

   this time. (Adriana Field, RN)

   

=================================================================

Datetime: 02/11/2017 07:00

=================================================================

   

 Monitor Mode:  External; Palpation (Adriana Field, RN)

 Frequency (min):  2.5-3 (Adriana Field, RN)

 Quality:  Mild/Moderate (Adriana Field, RN)

 Duration (sec):   (Adriana Field, RN)

 Resting Tone (Palpate):  Relaxed (Adriana Field, RN)

 Monitor Mode:  External US (Adriana Field, RN)

 FHR Baseline Rate :  140 (Adriana Field, RN)

 Variability:  Moderate 6-25 bpm (Adriana Field, RN)

 Accelerations:  10X10 (Adriana Field, RN)

 Decelerations:  None (Adriana Field, RN)

   

=================================================================

Datetime: 02/11/2017 06:58

=================================================================

   

 NBP Sys/Berta/Mean (mmHg):  117 (QS system process)

:  62 (QS system process)

:  83 (QS system process)

 Pulse:  74 (QS system process)

 LaborFlag:  Antepartum (QS system process)

   

=================================================================

Datetime: 02/11/2017 06:56

=================================================================

   

 Monitor Interventions for UA:  [place] Adjusted (Meri Michel RN)

 Patient Position/Activity:  Right Tilt (Meri AnandVIDYA weathers)

   

=================================================================

Datetime: 02/11/2017 06:28

=================================================================

   

 Frequency (min):  q2 minutes (Adriana Read RN)

 Pain Scale:  4 (Adriana Read RN)

 Pain Presence:  Constant (Adriana Read RN)

 Pain Type:  Burning (Annotations: per patient feels like burning

   across top of abdomen and she can hardly breathe) (Adriana Read RN)

 Pain Location:  Abdomen (Adriana Read RN)

 Pain Goal:  0 (Adriana Read RN)

 Pain Relief Measures:  Comfort Measures (Adriana Raed RN)

 Pain Coping:  Talking Through Contractions; Breathing Through

   Contractions (Adriana Read RN)

 Vaginal Bleeding:  None (Adriana Read RN)

 Level of Consciousness:  Fully Conscious (Adriana Read RN)

 DTR's/Clonus:  DTRs 2+; No Clonus (Adriana Read RN)

 Headache:  Denies (Adriana Read RN)

 Breath Sounds, Left:  Clear and Equal (Adriana Read RN)

 Breath Sounds, Right:  Clear and Equal (Adriana Read RN)

 Nausea/Vomiting:  Denies (Adriana Field, RN)

 RUQ Epigastric Pain:  Denies (Adriana Read RN)

 Instructional Method:  Verbal; Patient Instructed; Family/Support

   Person Instructed; Verbalized Understanding (Adriana Read RN)

 Plan of Care:  Plan of Care Discussed (Adriana Read RN)

 Unit Routine:  [place] to Room; Call New; Bed; Visiting Policy;

   Waiting Areas; Phone/Cell Phone Use; Unit Personnel; Handwashing;

   Flu/Illness Precautions; Fetal Monitoring; Safety/Fall Risk

   Prevention; Bathroom Privileges (Adriana Read RN)

 LaborFlag:  Antepartum (QS system process)

   

=================================================================

Datetime: 02/11/2017 06:26

=================================================================

   

 NBP Sys/Berta/Mean (mmHg):  119 (QS system process)

:  59 (QS system process)

:  85 (QS system process)

 Pulse:  79 (QS system process)

 LaborFlag:  Antepartum (QS system process)

## 2017-02-23 NOTE — L&D FLOW SHEET
=================================================================

LD Flowsheet

=================================================================

Datetime Report Generated by CPN: 2017 18:00

   

   

=================================================================

Datetime: 2017 17:32

=================================================================

   

Stage of Pregnancy:  OB Triage (Kathryn Witt RN)

 Respirations:  18 (Kathryn Witt RN)

 Monitor Mode:  External (Kathryn Witt RN)

 Monitor Interventions for UA:  [place] Adjusted (Kathryn Witt RN)

 Frequency (min):  x4 (Kathryn Witt RN)

 Quality:  Mild (Kathryn Witt RN)

 Duration (sec):  50-70 (Kathryn Witt RN)

 Resting Tone (Palpate):  Relaxed (Kathryn Witt RN)

 Monitor Mode:  External US (Kathryn Witt RN)

 Monitor Interventions for FHR:  Ultrasound Adjusted (Kathryn Witt RN)

 FHR Baseline Rate :  130 (Kathryn Witt RN)

 FHR Baseline Changes:  No Baseline Change (Kathryn Witt RN)

 Variability:  Moderate 6-25 bpm (Kathryn Witt, RN)

 Accelerations:  15X15 (Kathryn Witt, RN)

 Decelerations:  None (Kathryn Witt, RN)

 Pain Scale:  1 (Kathryn Witt, RN)

 Pain Presence:  Intermittent (Kathryn Witt, RN)

 Pain Type:  Cramping (Kathryn Witt, RN)

 Pain Location:  Abdomen (Kathryn Witt, RN)

 Pain Relief Measures:  Comfort Measures (Kathryn Witt, RN)

 Pain Coping:  Talking Through Contractions (Kathryn Witt, RN)

 Patient Position/Activity:  Left Tilt; Low Fowlers (Kathryn Witt, RN)

 Comfort Measures:  Family Support (Kathryn Witt, RN)

 I/O Interventions:  Up to BR (Kathryn Witt, RN)

 Provider Reviewed Strip:  Yes (Kathryn Witt, RN)

 Instructional Method:  Verbal; Patient Instructed; Family/Support

   Person Instructed; Verbalized Understanding (Kathryn Witt,

   RN)

 Plan of Care:  Plan of Care Discussed;  Labor (Kathryn Witt, RN)

 Pain Management:  Comfort Measures (Kathryn Witt, RN)

 PTL/PROM:  PTL Stimulating Activities; Hydration (Kathryn Witt, RN)

 Teaching Comments:  ptl _ kick counts care notes from prior visit

   reviewed- questions answered (Kathryn Witt, RN)

 Communication:  RN at Bedside; RN Reviewed Strip; Report Given to @ dr golden (Kathryn Witt, RN)

 Notification Reason:  Status Update; Fetal Status; Uterine Activity;

   Pain; Lab/Diagnostic Study (Kathryn Witt, RN)

 Communication Comments:  strip reviewed by md- orders received to d/c

   home. pt d/c home w/o c/o in stable condition accompanied by family

   @ [id number]. (Kathryn Witt, RN)

 LaborFlag:  OB Triage (QS system process)

   

=================================================================

Datetime: 2017 16:54

=================================================================

   

Stage of Pregnancy:  OB Triage (Kathryn Witt RN)

 Respirations:  18 (Kathryn Witt RN)

 Monitor Mode:  External (Kathryn Witt RN)

 Frequency (min):  irreg (Kathryn Witt RN)

 Quality:  Mild (Kathryn Witt RN)

 Duration (sec):  50-90 (Kathryn Witt RN)

 Resting Tone (Palpate):  Relaxed (Kathryn Witt RN)

 Monitor Mode:  External US (Kathryn Witt RN)

 FHR Baseline Rate :  135 (Kathryn Witt RN)

 FHR Baseline Changes:  No Baseline Change (Kathryn Witt RN)

 Variability:  Moderate 6-25 bpm (Kathryn Witt, VIDYA)

 Accelerations:  15X15 (Kathryn Witt, VIDYA)

 Decelerations:  None (Kathryn Witt RN)

 Pain Scale:  2 (Kathryn Witt RN)

 Pain Presence:  Intermittent (Kathryn Witt RN)

 Pain Type:  Contraction (Kathryn Witt RN)

 Pain Location:  Abdomen; Back (Kathryn Witt RN)

 Pain Relief Measures:  Comfort Measures (Kathryn Witt, RN)

 Pain Coping:  Talking Through Contractions (Kathryn Witt, RN)

 Antibiotics:  Ceftin IV 1 Gm (Kathryn Witt RN)

 IV/Blood Work:  IV Started; IV Bolus Started; New IV Bag Hung (Kathryn Witt, RN)

 Patient Position/Activity:  Left Tilt; Low Fowlers (Kathryn Witt, RN)

 Comfort Measures:  Family Support (Kathryn Witt RN)

 Instructional Method:  Verbal; Patient Instructed; Family/Support

   Person Instructed; Verbalized Understanding (Kathryn Witt RN)

 Plan of Care:  Plan of Care Discussed (Kathryn Witt RN)

 Medications:  Antibiotics (Kathryn Witt RN)

 Communication:  RN at Bedside; RN Reviewed Strip (Kathryn Witt RN)

 LaborFlag:  OB Triage (QS system process)

   

=================================================================

Datetime: 2017 16:37

=================================================================

   

 NBP Sys/Berta/Mean (mmHg):  97 (QS system process)

:  51 (QS system process)

:  70 (QS system process)

 Pulse:  83 (QS system process)

 LaborFlag:  OB Triage (QS system process)

   

=================================================================

Datetime: 2017 16:20

=================================================================

   

Stage of Pregnancy:  OB Triage (Kathryn Witt RN)

 Dilatation (cm):  3.0 (Kathryn Witt RN)

 Effacement (%):  25 (Kathryn Witt RN)

 Station:  -4 (Kathryn Witt RN)

 Exam by:  KEVEN PADRON CNM (Kathryn Mecca Roulund, RN)

 Vaginal Bleeding:  None (Kathryn Witt RN)

 Cervix, Consistency:  Soft (Kathryn Witt, RN)

 Cervix, Position:  Posterior (Kathryn Witt, RN)

 Procedures:  Sterile Vag Exam (Kathryn Witt, RN)

 Communication:  Provider at Bedside; Provider Orders Received (Kathryn Witt RN)

   

=================================================================

Datetime: 2017 16:10

=================================================================

   

Stage of Pregnancy:  OB Triage (Kathryn Witt RN)

 Respirations:  18 (Kathryn Witt RN)

 Monitor Mode:  External (Kathryn Witt, RN)

 Monitor Interventions for UA:  [place] Adjusted (Kathryn Witt, VIDYA)

 Frequency (min):  IRREG (Kathryn Witt RN)

 Quality:  Mild (Kathryn Witt, RN)

 Duration (sec):  55-90 (Kathryn Witt, RN)

 Resting Tone (Palpate):  Relaxed (Kathryn Witt, RN)

 Monitor Mode:  External US (Kathryn Witt RN)

 FHR Baseline Rate :  130 (Kathryn Witt RN)

 FHR Baseline Changes:  No Baseline Change (Kathryn Witt, RN)

 Variability:  Moderate 6-25 bpm (Kathryn Witt, VIDYA)

 Accelerations:  15X15 (Kathryn Witt, RN)

 Decelerations:  None (Kathryn Witt, RN)

 Pain Relief Measures:  Comfort Measures (Kathryn Witt RN)

 Pain Coping:  Talking Through Contractions (Kathryn Witt RN)

 Patient Position/Activity:  Left Tilt; Low Fowlers (Kathryn Witt, RN)

 Comfort Measures:  Family Support (Kathryn Witt, RN)

 Communication:  RN at Bedside; RN Reviewed Strip; Report Given to @ KEVEN PADRON CNM (Kathryn Witt, VIDYA)

 Notification Reason:  Fetal Status; Uterine Activity; Lab/Diagnostic

   Study (Kathryn Witt, RN)

 LaborFlag:  OB Triage (QS system process)

   

=================================================================

Datetime: 2017 16:07

=================================================================

   

 NBP Sys/Berta/Mean (mmHg):  117 (QS system process)

:  62 (QS system process)

:  81 (QS system process)

 Pulse:  83 (QS system process)

 LaborFlag:  OB Triage (QS system process)

## 2017-02-23 NOTE — NON STRESS TEST REPORT
=================================================================

Non Stress Test

=================================================================

Datetime Report Generated by CPN: 2017 17:44

   

   

=================================================================

DEMOGRAPHIC

=================================================================

   

EGA NST:  34.3

   

=================================================================

INDICATION

=================================================================

   

Indication for Study:   labor

   

=================================================================

MONITORING

=================================================================

   

Monitor Explained:  Monitor Explained; Test Explained; Patient

   Verbalized Understanding

Time on Monitor:  2017 15:35

Time off Monitor:  2017 17:32

NST Duration:  117

   

=================================================================

NST INTERVENTIONS

=================================================================

   

NST Interventions:  PO Hydration; Reposition Patient

Physician Notified NST:  DR NEILSEN REVIEWED STRIP

BABY A:  [id number]

   

=================================================================

BABY A

=================================================================

   

Fetal Movement :  Present

Contraction Frequency :  irreg

FHR Baseline :  130

Accelerations :  15X15

Decelerations :  None

Variability :  Moderate 6-25bpm

NST Review:  Meets Criteria for Reactive NST

NST Review and Verified By :  RADHA Burch RNC

NST Results:  Reactive

   

=================================================================

NST REPORT

=================================================================

   

Report Trigger:  Send Report

## 2017-02-23 NOTE — L&D FLOW SHEET
=================================================================

LD Flowsheet

=================================================================

Datetime Report Generated by CPN: 2017 16:00

   

   

=================================================================

Datetime: 2017 15:46

=================================================================

   

   

=================================================================

Uterine Activity

=================================================================

   

 Monitor Mode:  External; Palpation (Kathryn Witt, RN)

 Monitor Interventions for UA:  [place] Adjusted (Kathryn Witt, RN)

 Resting Tone (Palpate):  Relaxed (Kathryn Witt, RN)

   

=================================================================

Datetime: 2017 15:37

=================================================================

   

   

=================================================================

Vital Signs

=================================================================

   

Stage of Pregnancy:  OB Triage (Kathryn Witt RN)

 NBP Sys/Berta/Mean (mmHg):  114 (QS system process)

:  65 (QS system process)

:  82 (QS system process)

 Pulse:  91 (QS system process)

   

=================================================================

Uterine Activity

=================================================================

   

 Monitor Mode:  External; Palpation (Kathryn Witt RN)

 Monitor Interventions for UA:  [place] Adjusted (Kathryn Witt RN)

 Resting Tone (Palpate):  Relaxed (Kathryn Witt RN)

   

=================================================================

Fetal Assessment A

=================================================================

   

 Monitor Mode:  External US (Kathryn Castrorenee Cappslund, RN)

 Monitor Interventions for FHR:  Ultrasound Adjusted (Kathryn Castrorenee Witt, RN)

 FHR Baseline Rate :  130 (Kathryn Castron Jefrynichellend, RN)

   

=================================================================

Pain

=================================================================

   

 Pain Scale:  2 (Kathryn Castrorenee Cappslund, RN)

 Pain Presence:  Intermittent (Kathryn Cappslund, RN)

 Pain Type:  Contraction (Kathryn Witt, RN)

 Pain Location:  Abdomen; Back (Kathryn Witt, RN)

 Pain Goal:  1 (Kathryn Cappslund, RN)

 Pain Relief Measures:  Comfort Measures (Kathryn Castrorenee Cappslund, RN)

   

=================================================================

Vaginal Exam

=================================================================

   

 Membrane Status:  Intact (Kathryn Witt, RN)

 Vaginal Bleeding:  None (Kathryn Witt, RN)

   

=================================================================

Maternal Assessment

=================================================================

   

 Level of Consciousness:  Fully Conscious (Kathryn Witt, RN)

 DTR's/Clonus:  DTRs 1+; No Clonus (Kathryn Witt, RN)

 Headache:  Denies (Kathryn Witt, RN)

 Nausea/Vomiting:  Denies (Kathryn Witt, RN)

 RUQ Epigastric Pain:  Denies (Kathryn Witt, VIDYA)

   

=================================================================

Patient Care

=================================================================

   

 Patient Position/Activity:  Left Tilt; Low Fowlers (Kathryn Witt, RN)

 Comfort Measures:  Family Support (Kathryn Witt RN)

 I/O Interventions:  Popsicle; Clear Liquids Given; Up to BR (Kathryn Witt RN)

   

=================================================================

Teaching

=================================================================

   

 Instructional Method:  Verbal; Patient Instructed; Family/Support

   Person Instructed; Verbalized Understanding (Kathryn Witt RN)

 Plan of Care:  Plan of Care Discussed;  Labor (Kathryn Witt RN)

 Unit Routine:  [place] to Room; Call New; Bed; Phone/Cell Phone Use;

   Unit Personnel; Handwashing; Fetal Monitoring; Safety/Fall Risk

   Prevention; Diet/Nutrition Services; Bathroom Privileges (Kathryn Witt RN)

 Pain Management:  Pain Scale/Goals; Comfort Measures (Kathryn Witt RN)

 Pregnancy Related:  Common Discomforts of Pregnancy; Maternal Physical

   Changes; Maternal Emotional Changes; Nutrition; Hydration; Activity

   and Rest (Kathryn Witt RN)

   

=================================================================

Communication

=================================================================

   

 Communication:  RN at Bedside; RN Reviewed Strip (Kathryn Witt RN)

 LaborFlag:  OB Triage (QS system process)

   

=================================================================

Datetime: 2017 15:35

=================================================================

   

   

=================================================================

Medications

=================================================================

   

 Magnesium/Antihypertensives:  Procardia PO (mg) @ 10 mg (Kathryn Witt, VIDYA)

## 2017-03-14 NOTE — L&D FLOW SHEET
=================================================================

LD Flowsheet

=================================================================

Datetime Report Generated by CPN: 03/14/2017 18:00

   

   

=================================================================

Datetime: 03/14/2017 17:59

=================================================================

   

   

=================================================================

Vital Signs

=================================================================

   

 NBP Sys/Berta/Mean (mmHg):  107 (QS system process)

:  59 (QS system process)

:  78 (QS system process)

 Pulse:  101 (QS system process)

   

=================================================================

Communication

=================================================================

   

 LaborFlag:  OB Triage (QS system process)

   

=================================================================

Datetime: 03/14/2017 17:58

=================================================================

   

   

=================================================================

Vital Signs

=================================================================

   

 NBP Sys/Berta/Mean (mmHg):  100 (QS system process)

:  56 (QS system process)

:  73 (QS system process)

 Pulse:  97 (QS system process)

   

=================================================================

Communication

=================================================================

   

 LaborFlag:  OB Triage (QS system process)

## 2017-03-14 NOTE — NON STRESS TEST REPORT
=================================================================

Non Stress Test

=================================================================

Datetime Report Generated by CPN: 03/14/2017 18:54

   

   

=================================================================

DEMOGRAPHIC

=================================================================

   

EGA NST:  37.1

   

=================================================================

INDICATION

=================================================================

   

Indication for Study:  Ordered by Provider

   

=================================================================

VITAL SIGNS

=================================================================

   

Temperature - NST:  98.6

Pulse - NST:  101

RESP - NST:  18

NBPSYS NST:  107

NBPDIA NST:  59

   

=================================================================

MONITORING

=================================================================

   

Monitor Explained:  Monitor Explained; Test Explained; Patient

   Verbalized Understanding

Time on Monitor:  03/14/2017 17:56

Time off Monitor:  03/14/2017 18:45

NST Duration:  49

   

=================================================================

NST INTERVENTIONS

=================================================================

   

NST Interventions:  PO Hydration

Physician Notified NST:  Dr. Hart

BABY A:  [id number]

   

=================================================================

BABY A

=================================================================

   

Fetal Movement :  Present

Contraction Frequency :  irregular

FHR Baseline :  135

Accelerations :  15X15

Decelerations :  None

Variability :  Moderate 6-25bpm

NST Review:  Meets Criteria for Reactive NST

NST Results:  Reactive

   

=================================================================

NST REPORT

=================================================================

   

Report Trigger:  Send Report

## 2017-03-29 NOTE — L&D FLOW SHEET
=================================================================

LD Flowsheet

=================================================================

Datetime Report Generated by CPN: 2017 19:00

   

   

=================================================================

Datetime: 2017 17:10

=================================================================

   

   

=================================================================

Vital Signs

=================================================================

   

Stage of Pregnancy:  Recovery (Clarisse Marhefka, RN)

   

=================================================================

Pain

=================================================================

   

 Pain Scale:  0 (Clarisse Marhefka, RN)

 Pain Presence:  None/Denies (Clarisse Marhefka, RN)

 Pain Type:  N/A (Clarisse Marhefka, RN)

 Pain Goal:  0 (Clarisse Marhefka, RN)

 Pain Relief Measures:  Comfort Measures (Clarisse Marhefka, RN)

   

=================================================================

Datetime: 2017 16:58

=================================================================

   

 NBP Sys/Berta/Mean (mmHg):  115 (QS system process)

:  62 (QS system process)

:  82 (QS system process)

 Pulse:  80 (QS system process)

   

=================================================================

Datetime: 2017 16:55

=================================================================

   

   

=================================================================

Vital Signs

=================================================================

   

Stage of Pregnancy:  Recovery (Clarisse Marhefka, RN)

   

=================================================================

Datetime: 2017 16:40

=================================================================

   

   

=================================================================

Vital Signs

=================================================================

   

Stage of Pregnancy:  Recovery (Clarisse Marhefka, RN)

   

=================================================================

Datetime: 2017 16:28

=================================================================

   

 NBP Sys/Berta/Mean (mmHg):  111 (QS system process)

:  63 (QS system process)

:  81 (QS system process)

 Pulse:  89 (QS system process)

   

=================================================================

Datetime: 2017 16:25

=================================================================

   

   

=================================================================

Vital Signs

=================================================================

   

Stage of Pregnancy:  Recovery (Clarisse Marhefka, RN)

   

=================================================================

Pain

=================================================================

   

 Pain Scale:  2 (Clarisse Marhefka, RN)

 Pain Presence:  Constant (Clarisse Faith, RN)

 Pain Type:  Burning (Clarisse Faith, )

 Pain Location:  Perineum (Clarisse Faith, VIDYA)

 Pain Goal:  0 (Clarisse Faith, RN)

 Pain Relief Measures:  Pain Medication Given; Comfort Measures (Clarisse Faith, RN)

   

=================================================================

Datetime: 2017 16:19

=================================================================

   

   

=================================================================

Vital Signs

=================================================================

   

Stage of Pregnancy:  Recovery (Clarisse Faith, RN)

   

=================================================================

Datetime: 2017 16:10

=================================================================

   

   

=================================================================

Vital Signs

=================================================================

   

Stage of Pregnancy:  Recovery (Clarisse Faith, RN)

   

=================================================================

Pain

=================================================================

   

 Pain Scale:  2 (Clarisse Faith RN)

 Pain Presence:  Constant (Clarisse Faith RN)

 Pain Type:  Burning (Clarisse Faith RN)

 Pain Location:  Perineum (Clarisse Faith RN)

 Pain Goal:  0 (Clarisse Faith RN)

 Pain Relief Measures:  Comfort Measures (Clarisse Faith, VIDYA)

   

=================================================================

Datetime: 2017 15:58

=================================================================

   

 NBP Sys/Berta/Mean (mmHg):  114 (QS system process)

:  63 (QS system process)

:  82 (QS system process)

 Pulse:  78 (QS system process)

   

=================================================================

Datetime: 2017 15:55

=================================================================

   

   

=================================================================

Vital Signs

=================================================================

   

Stage of Pregnancy:  Recovery (Rhina Bellavance, RNC)

   

=================================================================

Pain

=================================================================

   

 Pain Scale:  2 (Rhina Bellavance, RNC)

 Pain Presence:  Constant (Rhina Bellavance, RNC)

 Pain Type:  Burning (Rhina Bellavance, RNC)

 Pain Location:  Perineum (Rhina Bellavance, RNC)

 Pain Goal:  0 (Rhina Bellavance, RNC)

 Pain Relief Measures:  Comfort Measures (Rhina Bellavance, RNC)

   

=================================================================

Datetime: 2017 15:42

=================================================================

   

 Comments:   viable baby girl  (Rhina Bellavance, RNC)

   

=================================================================

Datetime: 2017 15:41

=================================================================

   

   

=================================================================

Stage 2

=================================================================

   

 Stage 2 Comments:  head delivered (Clarisse Marhefka, RN)

   

=================================================================

Datetime: 2017 15:33

=================================================================

   

   

=================================================================

Vaginal Exam

=================================================================

   

 Dilatation (cm):  8.0 (Rhina Bellavance, RNC)

 Effacement (%):  90 (Rhina Bellavance, RNC)

 Station:  2 (Rhina Bellavance, RNC)

 Exam by:  Dr Guy (Rhina Bellavance, RNC)

   

=================================================================

Datetime: 2017 15:30

=================================================================

   

   

=================================================================

Uterine Activity

=================================================================

   

 Monitor Mode:  External (Clarisse Marhefka, RN)

 Frequency (min):  2-4 (Clarisse Marhefka, RN)

 Quality:  Moderate to Strong (Clarisse Marhefka, RN)

 Duration (sec):  60-80 (Clarisse Marhefka, RN)

 Resting Tone (Palpate):  Relaxed (Clarisse Marhefka, RN)

   

=================================================================

Fetal Assessment A

=================================================================

   

 Monitor Mode:  External US (Clarisse Marhefka, RN)

 FHR Baseline Rate :  140 (Clarisse Marhefka, RN)

 FHR Baseline Changes:  No Baseline Change (Clarisse Marhefka, RN)

 Variability:  Moderate 6-25 bpm (Clarisse Marhefka, RN)

 Accelerations:  None (Clarisse Marhefka, RN)

 Decelerations:  Early (Clarisse Marhefka, RN)

   

=================================================================

Medications

=================================================================

   

 Pitocin (milliunit):  Pitocin Remains (milliunits) @ (Annotations: 4)

   (Clarisse Marhefka, RN)

   

=================================================================

Datetime: 2017 15:28

=================================================================

   

 NBP Sys/Berta/Mean (mmHg):  116 (QS system process)

:  76 (QS system process)

:  88 (QS system process)

 Pulse:  113 (QS system process)

   

=================================================================

Communication

=================================================================

   

 LaborFlag:  OB Triage (QS system process)

   

=================================================================

Datetime: 2017 15:15

=================================================================

   

   

=================================================================

Uterine Activity

=================================================================

   

 Monitor Mode:  External; Palpation (Clarisse Marhefka, RN)

 Frequency (min):  2-3 (Clarisse Marhefka, RN)

 Quality:  Moderate to Strong (Clarisse Marhefka, RN)

 Duration (sec):   (Clarisse Marhefka, RN)

 Resting Tone (Palpate):  Relaxed (Clarisse Marhefka, RN)

   

=================================================================

Fetal Assessment A

=================================================================

   

 Monitor Mode:  External US (Clarisse Marhefka, RN)

 FHR Baseline Rate :  140 (Clarisse Marhefka, RN)

 FHR Baseline Changes:  No Baseline Change (Clarisse Marhefka, RN)

 Variability:  Moderate 6-25 bpm (Clarisse Marhefka, RN)

 Accelerations:  None (Clarisse Marhefka, RN)

 Decelerations:  Early (Clarisse Malvinfka, RN)

   

=================================================================

Medications

=================================================================

   

 Pitocin (milliunit):  Pitocin Increased to (milliunits) @

   (Annotations: 4) (Clarisse Coombsfka, RN)

   

=================================================================

Datetime: 2017 15:00

=================================================================

   

   

=================================================================

Uterine Activity

=================================================================

   

 Monitor Mode:  External; Palpation (Clarisse Faith, RN)

 Frequency (min):  3 (Clarisse Faith, RN)

 Quality:  Mild (Clarisse Marhefka, RN)

 Duration (sec):  60-70 (Clarisse Marhefka, RN)

 Resting Tone (Palpate):  Relaxed (Clarisse Marhefka, RN)

   

=================================================================

Fetal Assessment A

=================================================================

   

 Monitor Mode:  External US (Clarisse Marhefka, RN)

 FHR Baseline Rate :  140 (Clarisse Marhefka, RN)

 FHR Baseline Changes:  No Baseline Change (Clarisse Marhefka, RN)

 Variability:  Moderate 6-25 bpm (Clarisse Marhefka, RN)

 Accelerations:  15X15 (Clarisse Marhefka, RN)

 Decelerations:  Early (Clarisse Marhefka, RN)

   

=================================================================

Datetime: 2017 14:58

=================================================================

   

 NBP Sys/Berta/Mean (mmHg):  118 (QS system process)

:  65 (QS system process)

:  86 (QS system process)

 Pulse:  70 (QS system process)

 Respirations:  16 (Clarisse Marhefka, RN)

 Temperature (F):  98.2 (Clarisse Marhefka, RN)

 Temperature (C):  36.8 (QS system process)

 Temperature Route:  Oral (Clarisse Marhefka, RN)

   

=================================================================

Communication

=================================================================

   

 LaborFlag:  OB Triage (QS system process)

   

=================================================================

Datetime: 2017 14:51

=================================================================

   

 Monitor Interventions for UA:  Allen Adjusted (Clarisse Marhefka, RN)

   

=================================================================

Datetime: 2017 14:50

=================================================================

   

   

=================================================================

Medications

=================================================================

   

 Pitocin (milliunit):  Pitocin Started (milliunits) @ 2 (Clarisse Faith RN)

   

=================================================================

Datetime: 2017 14:36

=================================================================

   

   

=================================================================

Vaginal Exam

=================================================================

   

 Dilatation (cm):  5.0 (Clarisse Faith RN)

 Effacement (%):  50 (Clarisse Faith RN)

 Station:  -1 (Clarisse Faith RN)

 Exam by:  FAROOQ Conklin CNM  (Clarisse Faith RN)

 Membrane Status:  Ruptured (Clarisse Faith RN)

 Membranes Rupture Method:  Artificial (Clarisse Faith RN)

 Amniotic Fluid Color:  Clear (Clarisse Faith RN)

 Amniotic Fluid Amount:  Copious (Clarissesue Faith RN)

 Amniotic Fluid Odor:  Normal (Clarisse Faith RN)

 Vaginal Bleeding:  None (Clarisse Faith RN)

 Cervix, Consistency:  Soft (Clarisse Faith RN)

 Cervix, Position:  Midposition (Clarissesue Faith RN)

   

=================================================================

Datetime: 2017 14:33

=================================================================

   

 NBP Sys/Berta/Mean (mmHg):  122 (QS system process)

:  75 (QS system process)

:  90 (QS system process)

 Pulse:  77 (QS system process)

   

=================================================================

Communication

=================================================================

   

 LaborFlag:  OB Triage (QS system process)

   

=================================================================

Datetime: 2017 14:30

=================================================================

   

   

=================================================================

Uterine Activity

=================================================================

   

 Monitor Mode:  External (Clarisse Marhefka, RN)

 Frequency (min):  Irregular (Clarisse Marhefka, RN)

 Quality:  Mild/Moderate (Clarisse Marhefka, RN)

 Duration (sec):   (Clarisse Marhefka, RN)

 Resting Tone (Palpate):  Relaxed (Clarisse Marhefka, RN)

   

=================================================================

Fetal Assessment A

=================================================================

   

 Monitor Mode:  External US (Clarisse Marhefka, RN)

 FHR Baseline Rate :  135 (Clarisse Marhefka, RN)

 FHR Baseline Changes:  No Baseline Change (Clarisse Marhefka, RN)

 Variability:  Moderate 6-25 bpm (Clarisse Marhefka, RN)

 Accelerations:  15X15 (Clarisse Marhefka, RN)

 Decelerations:  None (Clarises Marhefka, RN)

   

=================================================================

Datetime: 2017 14:27

=================================================================

   

 I/O Interventions:  Up to BR (Clarisse Marhefka, RN)

   

=================================================================

Datetime: 2017 14:00

=================================================================

   

   

=================================================================

Uterine Activity

=================================================================

   

 Monitor Mode:  External (Clarisse Marhefka, RN)

 Frequency (min):  3-9 (Clarisse Marhefka, RN)

 Quality:  Mild (Clarisse Marhefka, RN)

 Duration (sec):   (Clarisse Marhefka, RN)

 Resting Tone (Palpate):  Relaxed (Clarisse Marhefka, RN)

   

=================================================================

Fetal Assessment A

=================================================================

   

 Monitor Mode:  External US (Clarisse Marhefka, RN)

 FHR Baseline Rate :  135 (Clarisse Marhefka, RN)

 FHR Baseline Changes:  No Baseline Change (Clarisse Marhefka, RN)

 Variability:  Moderate 6-25 bpm (Clarisse Marhefka, RN)

 Accelerations:  15X15 (Clarisse Marhefka, RN)

 Decelerations:  None (Clarises Marhefka, RN)

   

=================================================================

Datetime: 2017 13:59

=================================================================

   

 NBP Sys/Berta/Mean (mmHg):  96 (QS system process)

:  51 (QS system process)

:  68 (QS system process)

 Pulse:  84 (QS system process)

   

=================================================================

Communication

=================================================================

   

 LaborFlag:  OB Triage (QS system process)

   

=================================================================

Datetime: 2017 13:30

=================================================================

   

   

=================================================================

Uterine Activity

=================================================================

   

 Monitor Mode:  External; Palpation (Clarisse Coombsfka, RN)

 Frequency (min):  Irregular (Clarisse Faith, RN)

 Quality:  Mild (Clarisse Faith, RN)

 Duration (sec):  110-130 (Clarisse Coombsfrayna, RN)

 Resting Tone (Palpate):  Relaxed (Clarisse Coombsfrayna, RN)

   

=================================================================

Fetal Assessment A

=================================================================

   

 Monitor Mode:  External US (Clarisse Marhefka, RN)

 FHR Baseline Rate :  135 (Clarisse Marhefka, RN)

 FHR Baseline Changes:  No Baseline Change (Clarisse Marhefka, RN)

 Variability:  Moderate 6-25 bpm (Clarisse Marhefka, RN)

 Accelerations:  15X15 (Clarisse Marhefka, RN)

 Decelerations:  None (Clarisse Marhefka, RN)

   

=================================================================

Datetime: 2017 13:28

=================================================================

   

 NBP Sys/Berta/Mean (mmHg):  107 (QS system process)

:  52 (QS system process)

:  73 (QS system process)

 Pulse:  82 (QS system process)

   

=================================================================

Communication

=================================================================

   

 LaborFlag:  OB Triage (QS system process)

   

=================================================================

Datetime: 2017 13:00

=================================================================

   

   

=================================================================

Uterine Activity

=================================================================

   

 Monitor Mode:  External (Clarisse Marhefka, RN)

 Frequency (min):  Irregular  (Clarisse Marhefka, RN)

 Quality:  Mild (Clarisse Marhefka, RN)

 Duration (sec):   (Clarisse Marhefka, RN)

 Resting Tone (Palpate):  Relaxed (Clarisse Marhefka, RN)

   

=================================================================

Fetal Assessment A

=================================================================

   

 Monitor Mode:  External US (Clarisse Marhefka, RN)

 FHR Baseline Rate :  135 (Clarisse Marhefka, RN)

 FHR Baseline Changes:  No Baseline Change (Clarisse Marhefka, RN)

 Variability:  Moderate 6-25 bpm (Clarisse Marhefka, RN)

 Accelerations:  15X15 (Clarisse Marhefka, RN)

 Decelerations:  None (Clarisse Marhefka, RN)

   

=================================================================

Datetime: 2017 12:58

=================================================================

   

 NBP Sys/Berta/Mean (mmHg):  91 (QS system process)

:  52 (QS system process)

:  66 (QS system process)

 Pulse:  75 (QS system process)

   

=================================================================

Communication

=================================================================

   

 LaborFlag:  OB Triage (QS system process)

   

=================================================================

Datetime: 2017 12:30

=================================================================

   

   

=================================================================

Uterine Activity

=================================================================

   

 Monitor Mode:  External; Palpation (Clarisse Marhefka, RN)

 Frequency (min):  Irregular (Clarisse Marhefka, RN)

 Quality:  Mild (Clarisse Marhefka, RN)

 Duration (sec):   (Clarisse Marhefka, RN)

 Resting Tone (Palpate):  Relaxed (Clarisse Marhefka, RN)

   

=================================================================

Fetal Assessment A

=================================================================

   

 Monitor Mode:  External US (Clarisse Marhefka, RN)

 FHR Baseline Rate :  140 (Clarisse Marhefka, RN)

 FHR Baseline Changes:  No Baseline Change (Clarisse Marhefka, RN)

 Variability:  Moderate 6-25 bpm (Clarisse Marhefka, RN)

 Accelerations:  15X15 (Clarisse Marhefka, RN)

 Decelerations:  None (Clarisse Marhefka, RN)

   

=================================================================

Datetime: 2017 12:28

=================================================================

   

 NBP Sys/Berta/Mean (mmHg):  113 (QS system process)

:  69 (QS system process)

:  86 (QS system process)

 Pulse:  86 (QS system process)

 Respirations:  16 (Clarisse Marhefka, RN)

 Temperature (F):  98.1 (Clarisse Marhefka, RN)

 Temperature (C):  36.7 (QS system process)

 Temperature Route:  Oral (Clarisse Marhefka, RN)

   

=================================================================

Communication

=================================================================

   

 LaborFlag:  OB Triage (QS system process)

   

=================================================================

Datetime: 2017 12:00

=================================================================

   

   

=================================================================

Uterine Activity

=================================================================

   

 Monitor Mode:  External (Clarisse Marhefka, RN)

 Frequency (min):  Irregular (Clarisse Marhefka, RN)

 Quality:  Mild (Clarisse Marhefka, RN)

 Duration (sec):  40-90 (Clarisse Marhefka, RN)

 Resting Tone (Palpate):  Relaxed (Clarisse Marhefka, RN)

   

=================================================================

Fetal Assessment A

=================================================================

   

 Monitor Mode:  External US (Clarisse Marhefka, RN)

 FHR Baseline Rate :  145 (Clarisse Marhefka, RN)

 FHR Baseline Changes:  No Baseline Change (Clarisse Marhefka, RN)

 Variability:  Moderate 6-25 bpm (Clarisse Marhefka, RN)

 Accelerations:  10X10 (Clarisse Marhefka, RN)

 Decelerations:  None (Clarisse Marhefka, RN)

   

=================================================================

Datetime: 2017 11:59

=================================================================

   

   

=================================================================

Pain

=================================================================

   

 Pain Scale:  0 (Clarisse Marhefka, RN)

 Pain Presence:  None/Denies (Clarisse Marhefka, RN)

 Pain Type:  N/A (Clarisse Marhefka, RN)

 Pain Goal:  0 (Clarisse Marhefka, RN)

 Pain Relief Measures:  Comfort Measures (Clarisse Marhefka, RN)

 Membrane Status:  Intact (Clarisse Marhefka, RN)

 Vaginal Bleeding:  None (Clarisse Marhefka, RN)

   

=================================================================

Maternal Assessment

=================================================================

   

 Level of Consciousness:  Fully Conscious (Clarisse Marhefka, RN)

 DTR's/Clonus:  DTRs 2+; No Clonus (Clarisse Marhefka, RN)

 Headache:  Denies (Clarisse Marhefka, RN)

 Breath Sounds, Left:  Clear and Equal (Clarisse Marhefka, RN)

 Breath Sounds, Right:  Clear and Equal (Clarisse Marhefka, RN)

 Nausea/Vomiting:  Denies (Clarisse Faith, RN)

 RUQ Epigastric Pain:  Denies (Clarisse Faith, RN)

   

=================================================================

Communication

=================================================================

   

 LaborFlag:  OB Triage (QS system process)

   

=================================================================

Datetime: 2017 11:48

=================================================================

   

   

=================================================================

Patient Care

=================================================================

   

 IV/Blood Work:  Labs Drawn (Clarisse Faith, RN)

   

=================================================================

Datetime: 2017 11:43

=================================================================

   

 Unit Routine:  Consents Signed (Clarisse Marhefka, RN)

   

=================================================================

Datetime: 2017 11:37

=================================================================

   

   

=================================================================

Patient Care

=================================================================

   

 IV/Blood Work:  IV Started (Clarisse Marhefka, RN)

   

=================================================================

Datetime: 2017 11:30

=================================================================

   

 Patient Position/Activity:  Left Tilt; Semi-Fowlers (Clarisse Faith RN)

   

=================================================================

Teaching

=================================================================

   

 Instructional Method:  Verbal; Patient Instructed; Verbalized

   Understanding (Clarisse Faith RN)

 Plan of Care:  Plan of Care Discussed (Clarisse Faith RN)

 Unit Routine:  [place] to Room; Call New; Bed; Unit Personnel; Fetal

   Monitoring; Bathroom Privileges (Clarisse Faith RN)

 Labor/Induction:  Induction (Clarisse Faith RN)

## 2017-03-29 NOTE — DELIVERY SUMMARY
=================================================================

Del Sum A-C

=================================================================

Datetime Report Generated by CPN: 2017 17:33

   

   

=================================================================

ADMISSION DATA

=================================================================

   

Chief Complaint:  Scheduled Induction of Labor

Indication for Induction:  Other

Indication for Induction Comment:  Elective

Admission Impression:  Term, Intrauterine Pregnancy

Admit Provider Comments:  22yo  at 32+0ega presents from the office

   for ctx and spotting after intercourse last pm.  She reports mild

   lower abd cramping but is ctx q 2-3 minutes.  Cvx reported in the

   office as 1-2/th/oop.  Cvx on my ck 3.5/50/post/soft/-3.  PCN for

   GBS prophy.  Mag for Neuroprotection.  BMZ for FLM.  will continue

   to monitor overnight.  deliver for unstoppable  labor.  Prior

   term delivery at 40+6ega (baby 9#1oz).  Admit for poss 

   labor.  Will re-evaluate cvx as needed.

   

=================================================================

DELIVERY PERSONNEL

=================================================================

   

Delivery Doctor::  Torie Tovar MD

Labor and Delivery Nurse::  Clarisse Faith RN

Labor and Delivery Nurse::  Rhina Burch, RNC

Nursery Nurse::  Rhina Burch, RNC

Scr Tech/CNA:  Colleenterri Mccall, CNA II

   

=================================================================

MATERNAL INFORMATION

=================================================================

   

Delivery Anesthesia:  None

Medications After Delivery:  Pitocin Bolus-Please Comment

Meds After Delivery Comment:  20 Units Pitocin Bolus/1000ml NS 

Estimated  Blood Loss (ml):  300

Maternal Complications:  Precipitous Labor (<3hrs)

   

=================================================================

LABOR SUMMARY

=================================================================

   

EDC:  2017 00:00

No. Babies in Womb:  1

 Attempted:  No

Labor Anesthesia:  None

   

=================================================================

LABOR INFORMATION

=================================================================

   

Reason for Induction:  Other

Reason for Induction- Other:  Polyhydramnios and advanced dilation

Onset of Labor:  2017 14:36

Complete Dilatation:  2017 15:39

Oxytocin:  Induction

Group B Beta Strep:  unknown

Antibiotics # of Doses:  0

Antibiotics Time of Last Dose:  N/A

Steroids Given:  None

Reason Steroids Not Administered:  Fetal Indication

   

=================================================================

MEMBRANES

=================================================================

   

Membranes Rupture Method:  Artificial

Rupture of Membranes:  2017 14:36

Length of Rupture (hr):  1.10

Amniotic Fluid Color:  Clear

Amniotic Fluid Amount:  Copious

Amniotic Fluid Odor:  Normal

   

=================================================================

STAGES OF LABOR

=================================================================

   

Stage 1 hr:  1

Stage 1 min:  3

Stage 2 hr:  0

Stage 2 min:  3

Stage 3 hr:  0

Stage 3 min:  4

Total Time in Labor hr:  1

Total Time in Labor min:  10

   

=================================================================

VAGINAL DELIVERY

=================================================================

   

Episiotomy:  None

Laceration Extension:  N/A

Laceration Type:  None

Laceration Repair:  Not Applicable

Sponge Count Correct:  N/A

Sharps Count Correct:  N/A

   

=================================================================

BABY A INFORMATION

=================================================================

   

Infant Delivery Date/Time:  2017 15:42

Method of Delivery:  Vaginal

Born in Route :  No

:  N/A

Forceps:  N/A

Vacuum Extraction:  N/A

Shoulder Dystocia :  Yes

   

=================================================================

SHOULDER DYSTOCIA BABY A

=================================================================

   

Delivery of Head:  2017 15:42

Time Head to Delivery :  0.0

1st Intervention to Resolve:  McRobert's Maneuver

2nd Intervention to Resolve:  Suprapubic Pressure

Verify NO Fundal Pressure:  No Fundal Pressure Applied

Arm Under Symphisis at Del:  Right

   

=================================================================

PRESENTATION/POSITION BABY A

=================================================================

   

Presentation:  Cephalic

Cephalic Presentation:  Vertex

Vertex Position:  Left Occipital Anterior

Breech Presentation:  N/A

   

=================================================================

PLACENTA INFORMATION BABY A

=================================================================

   

Placenta Delivery Time :  2017 15:46

Placenta Method of Delivery:  Spontaneous

Placenta Status:  Delivered

   

=================================================================

APGAR SCORES BABY A

=================================================================

   

Heart Rate 1 min:  >100 bpm

Resp Effort 1 min:  Good Cry

Reflex Irritability 1 min:  Cough or Sneeze or Pulls Away

Muscle Tone 1 min:  Active Motion

Color 1 min:  Body Pink, Extremities Blue

Resuscitation Effort 1 min:  Tactile Stimulation

APGAR SCORE 1 MIN:  9

Heart Rate 5 min:  >100 bpm

Resp Effort 5 min:  Good Cry

Reflex Irritability 5 min:  Cough or Sneeze or Pulls Away

Muscle Tone 5 min:  Active Motion

Color 5 min:  Body Pink, Extremities Blue

Resuscitation Effort 5 min:  Tactile Stimulation

APGAR SCORE 5 MIN:  9

   

=================================================================

INFANT INFORMATION BABY A

=================================================================

   

Gestational Age at Delivery:  39.2

Gestational Status:  Full Term- 39- 40.6 Weeks

Infant Outcome :  Liveborn

Infant Condition :  Stable

Infant Sex:  Female

   

=================================================================

CORD INFORMATION BABY A

=================================================================

   

No. Cord Vessels:  3

Nuchal Cord :  N/A

Cord Blood Taken:  Yes-For Eval (Mom's Blood Type - or O+)

Infant Suction:  Mouth; Nose

   

=================================================================

ASSESSMENT BABY A

=================================================================

   

Infant Complications:  None

Physical Findings at Delivery:  Within Normal Limits

Infant Respirations:  Appears Normal

Skin to Skin:  Yes

Neonatologist/ALS Called :  No

Infant Care By:  Rhina Bellavance, RNC

Transferred To:  Remains with Mother

   

=================================================================

BABY B INFORMATION

=================================================================

   

 :  N/A

## 2017-03-29 NOTE — ADMISSION PHYSICAL
=================================================================



=================================================================

Datetime Report Generated by CPN: 2017 17:38

   

   

=================================================================

CURRENT ADMISSION

=================================================================

   

Prenatal Hx Assessment:  The Prenatal History has been Reviewed and is

   Current

Chief Complaint:  Scheduled Induction of Labor

Chief Complaint:  Uterine Contractions

Indication for Induction:  Other

Indication for Induction:  Not Applicable

Indication for Induction- Other:  Elective

Admit Plan:  Initiate Labor Induction Protocol

Admit Plan:  Admit to Unit; Initiate  Labor Protocol

   

=================================================================

ALLERGIES

=================================================================

   

Medication Allergies:  No

Medication Allergies:  No Known Allergies (2017)

Medication Allergies:  No Known Allergies (10/10/2016)

Latex:  No Latex Allergies

Food Allergies:  denies

Environmental Allergies:  denies

   

=================================================================

OBSTETRICAL HISTORY

=================================================================

   

EDC:  2017 00:00

:  2

Para:  1

Para:  1

Term:  1

:  0

SAB:  0

IAB:  0

Ectopic:  0

Livin

Cesareans:  0

VBACs:  0

Multiple Births:  0

Gestational Diabetes:  No

Rh Sensitization:  No

Incompetent Cervix:  No

CARLOS EDUARDO:  No

Infertility:  No

ART Treatment:  No

Uterine Anomaly:  No

IUGR:  No

Hx Previous C/S:  No

Macrosomia:  No

Hx Loss/Stillborn:  No

PIH:  No

Hx  Death:  No

Placenta Previa/Abruption:  No

Depression/PP Depression:  No

PTL/PROM:  No

Post Partum Hemorrhage:  No

Current Pregnancy Procedures:  Ultrasound; NST

Obstetrical History Comments:  G1:  female infant 40 w 6 d 9 lb 1 oz 

   G2: current pregnancy IOL for polyhyrdramnios

   

=================================================================

***SEE PRENATAL RECORDS***

=================================================================

   

Alcohol:  No

Marijuana :  No

Cocaine:  No

Other Illicit Drugs:  No

Cigarettes:  Never Smoker. [id number]

   

=================================================================

MEDICAL HISTORY

=================================================================

   

Diabetes:  No

Blood Transfusion:  No

Pulmonary Disease (Asthma, TB):  No

Breast Disease:  No

Hypertension:  No

Gyn Surgery:  No

Heart Disease:  No

Hosp/Surgery:  Yes

Autoimmune Disorder:  No

Anesthetic Complications:  No

Kidney Disease:  No

Abnormal Pap Smear:  No

Neuro/Epilepsy:  No

Psychiatric Disorders:  No

Hepatitis/Liver Disease:  No

Significant Family History:  No

Varicosities/Phlebitis:  No

Thyroid Dysfunction:  Yes (Annotations: Data stored by N on behalf of

   user)

Medical History Comments:  appendectomy unsure of what year

   hypothyroid on synthroid 

   

=================================================================

INFECTIOUS HISTORY

=================================================================

   

Gonorrhea:  No

Genital Herpes:  No

Chlamydia:  No

Tuberculosis:  No

Syphilis:  No

Hepatitis:  No

HIV/AIDS Exposure:  No

Rash or Viral Illness:  No

HPV:  No

   

=================================================================

PHYSICAL EXAM

=================================================================

   

General:  Normal

General:  Normal

HEENT:  Normal

HEENT:  Normal

Neurologic:  Normal

Neurologic:  Normal

Thyroid:  Deferred

Thyroid:  Normal

Heart:  Normal

Heart:  Normal

Lungs:  Normal

Lungs:  Normal

Breast:  Deferred

Breast:  Deferred

Back:  Normal

Back:  Normal

Abdomen:  Normal

Abdomen:  Normal

Genitourinary Exam:  Normal

Genitourinary Exam:  Normal

Extremities:  Normal

Extremities:  Normal

DTRs:  Normal

DTRs:  Normal

Pelvic Type:  Adequate

Pelvic Type:  Adequate

Vital Signs:  Reviewed; Within Normal Limits

Vital Signs:  Reviewed; Within Normal Limits

   

=================================================================

VAGINAL EXAM

=================================================================

   

Dilatation:  3

Dilatation:  4

Dilatation:  4

Effacement:  50

Effacement:  50

Station:  -3

Station:  -3

Contraction Comments:  q 2-3

Contraction Comments:  q 2-3 minutes

   

=================================================================

MEMBRANES

=================================================================

   

Membranes:  Intact

   

=================================================================

FETUS A

=================================================================

   

EGA:  32.2

EGA:  32.0

Monitoring:  External US

Accelerations:  15X15

Decelerations:  None

FHR Category:  Category I

Fetal Presentation:  Vertex

Admit Comment:  22yo  at 32+0ega presents from the office for ctx

   and spotting after intercourse last pm.  She reports mild lower abd

   cramping but is ctx q 2-3 minutes.  Cvx reported in the office as

   1-2/th/oop.  Cvx on my ck 3.5/50/post/soft/-3.  PCN for GBS prophy. 

   Mag for Neuroprotection.  BMZ for FLM.  will continue to monitor

   overnight.  deliver for unstoppable  labor.  Prior term

   delivery at 40+6ega (baby 9#1oz).  Admit for poss  labor. 

   Will re-evaluate cvx as needed.

   

=================================================================

PLANS FOR LABOR AND DELIVERY

=================================================================

   

Labor and Delivery:  None

Pain Management:  Medications

Feeding Preference:  Breast

Benefit of Breast Feed Discussed:  Yes

Circumcision:  N/A

   

=================================================================

INFORMED CONSENT

=================================================================

   

Informed Consent Obtained:  Vaginal Delivery;  Section

   Delivery; Risks, Benefits and Alternatives Discussed

Informed Consent Obtained:  Vaginal Delivery; Risks, Benefits and

   Alternatives Discussed

Signature:  Electronically signed by Eugenie Mortensen MD ([email or web address]) on

   2017 at 18:24  with User ID: [email or web address]

## 2017-03-30 NOTE — L&D CURRENT ADMISSION
=================================================================

Current Admit

=================================================================

Datetime Report Generated by CPN: 03/30/2017 06:00

   

   

=================================================================

ADMISSION INFORMATION

=================================================================

   

Current Admit Date/Time:  03/29/2017 11:15    (03/14/2017 18:05:RADHA Mobley)

Reason for Admission:  Induction of Labor    (03/14/2017 18:05:RADHA Mobley)

Chief Complaint:  Scheduled Induction of Labor    (03/29/2017

   11:59:Clarisse Faith RN)

Medications During Pregnancy:  Prenatal Vitamin; Levothyroxine Sodium

   (Synthroid)    (03/14/2017 18:05:RADHA Mobley)

EGA per Dates:  39.2    (03/14/2017 18:05:QS system process)

Admitted From:  Home    (03/14/2017 18:05:RADHA Mobley)

Reason for Induction:  Polyhydramnios    (03/14/2017 18:05:RADHA Mobley)

Prenatal Records Available:  Yes    (03/14/2017 18:05:RADHA Mobley)

General Admission Information:  Reviewed; Updated; Confirmed   

   (03/14/2017 18:05:RADHA Mobley)

General Admission Reviewed By:  WOODROW Faith RN    (03/14/2017

   18:05:RADHA Mobley)

   

=================================================================

BELONGINGS/ADVANCED DIRECTIVES

=================================================================

   

Other Belongings:  See [facility] Valuables Consent    (03/14/2017 18:05:RADHA Mobley)

Disposition of Belongings:  Kept with Patient    (03/14/2017

   18:05:RADHA Mobley)

Advance Direct for Healthcare:  No, and Wants No Information   

   (03/14/2017 18:05:RADHA Mobley)

Durable Power of :  No    (03/14/2017 18:05:RADHA Mobley)

Living Will:  No    (03/14/2017 18:05:RADHA Mobley)

Organ Donor:  Yes    (03/14/2017 18:05:RADHA Mobley)

Pt Rights Information Given:  Yes    (03/14/2017 18:05:RADHA Mobley)

Pt Understands Pt Rights:  Yes    (03/14/2017 18:05:RADHA Mobley)

   

=================================================================

LEARNING ASSESSMENT

=================================================================

   

Knowledge Level:  Understands L_D Process; Understands Care Activities;

   Understands Diagnosis    (03/14/2017 18:05:RADHA Mobley)

Barriers to Learning:  None    (03/14/2017 18:05:RDAHA Mobley)

Learning Readiness:  Motivated    (03/14/2017 18:05:RADHA Mobley)

Learns Best By:  1 to 1 Instruction; Demonstration    (03/14/2017

   18:05:RADHA Mobley)

Learning Needs:  Labor and Delivery Process; Pain Management; Symptoms

   to Report; Treatment Plan; Medication; Diagnosis; Nutrition;

   Equipment; Infant Care; Community Resources    (03/14/2017

   18:05:RADHA Mobley)

   

=================================================================

DOMESTIC VIOLANCE SCREENING

=================================================================

   

Dom Viol Threatened/Hurt:  No    (03/14/2017 18:05:RADHA Mobley)

Hx of Abuse/Neglect past 2yrs:  No    (03/14/2017 18:05:RADHA Mobley)

Feel Unsafe Going Home:  No    (03/14/2017 18:05:RADHA Mobley)

Addt'l Observ Indicating Abuse:  No    (03/14/2017 18:05:RADHA Mobley)

Reason Unable to Complete Screen:  N/A, Screen Completed    (03/14/2017

   18:05:RADHA Mobley)

Considered Personal Harm/Suicide:  No    (03/14/2017 18:05:RADHA Mobley)

   

=================================================================

NUTRITIONAL/FUNCTIONAL SCREENING

=================================================================

   

Problem with Appetite >5 Days:  No    (03/14/2017 18:05:RADHA Mobley)

Chew/Swallow Difficulties:  No    (03/14/2017 18:05:RADHA Mobley)

Inappropriate Wt Gain/Loss:  No    (03/14/2017 18:05:RADHA Mobley)

Presence Skin Breakdown/Ulcer:  No    (03/14/2017 18:05:RADHA Mobley)

Special Diet:  No    (03/14/2017 18:05:RADHA Mobley)

Pt Requests Dietician Visit:  No    (03/14/2017 18:05:RADHA Mobley)

Hx of Any of the Following?:  N/A    (03/14/2017 18:05:RADHA Mobley)

New Diagnosis of:  N/A    (03/14/2017 18:05:RADHA Mobley)

Requires Assist w/Ambulation:  No    (03/14/2017 18:05:RADHA Mobley)

Uses Assist Device to Ambulate:  No    (03/14/2017 18:05:RADHA Mobley)

Pt Requires Help w/ADL's:  No    (03/14/2017 18:05:RADHA Mobley)

## 2017-03-30 NOTE — L&D GENERAL ADMISSION
=================================================================

General Admit

=================================================================

Datetime Report Generated by CPN: 2017 06:00

   

   

=================================================================

PREGNANCY INFORMATION

=================================================================

   

Patient Age:  21    (2017 14:43:QS system process)

EDC:  2017 00:00    (2017 15:11:Maris Tovar RN)

:  2    (2017 15:11:Maris Tovar RN)

Para:  1    (2017 12:13:Maris Tovar RN)

Term:  1    (2017 15:11:Rere Fox RN)

:  0    (2017 15:11:Rere Fox RN)

Spontaneous Abortions:  0    (2017 15:11:Rere Fox RN)

Induced Abortions:  0    (2017 15:11:Rere Fox RN)

Livin    (2017 15:11:Rere Fox RN)

Cesareans:  0    (2017 15:11:Rere Fox RN)

VBACs:  0    (2017 15:11:Rere Fox RN)

Ectopic:  0    (2017 15:11:Rere Fox RN)

Multiple Births:  0    (2017 15:11:Rere Fox RN)

Baby, Number in Womb:  1    (2017 12:13:Maris Tovar RN)

   

=================================================================

PRENATAL CARE

=================================================================

   

Primary [facility]st[facility]:  [facility]    (2017

   15:11:Maris Tovar RN)

Month of 1st Prenatal Visit:  October    (2017 15:11:Maris Tovar RN)

Adequate Prenatal Care:  Yes    (2017 15:11:Maris Tovar RN)

Prepregnancy Weight (lb):  220    (2017 15:11:Rere Fox RN)

Prepregnancy Weight  (kg):  100.0    (2017 15:11:QS system

   process)

Height (in):  71    (2017 18:09:QS system process)

   

=================================================================

ALLERGIES

=================================================================

   

Medication Allergy:  No    (2017 15:11:Maris Tovar RN)

Medication Allergies:  No Known Allergies (2017)    (2017

   18:17:QS system process)

Latex Allergy:  No Latex Allergies    (2017 15:11:Maris Tovar RN)

Food Allergies:  denies    (2017 15:11:Maris Tovar RN)

Environmental Allergies:  denies    (2017 15:11:Maris Tovar RN)

   

=================================================================

COMMUNICATION

=================================================================

   

Primary Language:  English    (2017 15:11:Maris Tovar RN)

Medical Tx Preferred Language:  English    (2017 15:11:Maris Tovar RN)

Communication Barrier(s):  None    (2017 15:11:Kathryn Witt RN)

   

=================================================================

DEMOGRAPHICS

=================================================================

   

Address:  [address] [place] [address], [address] 

   [place]   [address]    (2017 14:43:QS system process)

Zipcode:  [address]    (2017 14:43:QS system process)

Home Telephone:  [phone]    (2017 17:40:QS system process)

SSN:      (2017 14:43:QS system process)

Next of Kin Name:  CHARO BANDA    (2017 14:43:QS system process)

Next of Kin Phone:  [phone]    (2017 06:06:QS system

   process)

Next of Kin Relationship:  FA    (2017 17:40:QS system process)

YOB: 1995    (2017 14:43:QS system process)

Marital Status:      (2017 14:43:QS system process)

Sex:  Female    (2017 14:43:QS system process)

Race:      (2017 14:43:QS system process)

Ethnicity:  Non- or     (2017 14:43:QS system

   process)

Confucianism:  None    (2017 14:43:QS system process)

FOB Involved:  Yes    (2017 15:11:Adriana Read RN)

Father of Baby Name:  Dorian Garcia    (2017 15:11:Adriana Read RN)

   

=================================================================

DRUG AND ALCOHOL USE

=================================================================

   

Alcohol:  No    (2017 15:11:Maris Tovar RN)

Cigarettes:  Never Smoker. [id number]    (2017 15:11:Maris Tovar RN)

Marijuana:  No    (2017 15:11:Maris Tovar RN)

Cocaine:  No    (2017 15:11:Maris Tovar RN)

Other Illicit Drugs:  No    (2017 15:11:Maris Tovar RN)

   

=================================================================

VACCINE HISTORY

=================================================================

   

Influenza Vaccine:  No    (2017 15:11:Maris Tovar RN)

Pneumococcal Vaccine:  No    (2017 15:11:Maris Tovar RN)

Tetanus Vaccine:  No    (2017 15:11:Maris Tovar RN)

Hepatitis B Vaccine:  Yes    (2017 15:11:Maris Tovar RN)

   

=================================================================



=================================================================

   

Pediatrician:  [facility]    (2017

   15:11:Maris Tovar RN)

Feeding Preference:  Breast    (2017 15:11:Maris Tovar RN)

Benefit of Breast Feed Discussed:  Yes    (2017 15:11:Maris Tovar RN)

Circumcision:  N/A    (2017 15:11:Maris Tovar RN)

Prenatal Classes Attended:  No    (2017 15:11:Maris Tovar RN)

Tubal Ligation:  No    (2017 15:11:Maris Tovar RN)

Tubal Authorization Signed:  N/A    (2017 15:11:Maris Tovar RN)

 Consent:  N/A    (2017 15:11:Maris Tovar RN)

 Consent Signed:  N/A    (2017 15:11:Maris Tovar RN)

Pain Management Plans:  Medications    (2017 15:11:Maris Tovar RN)

Plans for Labor and Delivery:  None    (2017 15:11:Maris Tovar RN)

Support Person:  Dorian Garcia    (2017 15:11:Maris Tovar RN)

Support Person Relationship:      (2017 15:11:Maris Tovar RN)

Cultural/Spritual Practice:  No    (2017 15:11:Maris Tovar RN)

Spir/Cult Dietary Needs:  No    (2017 15:11:Maris Tovar RN)

   

=================================================================

LIVING SITUATION/DISCHARGE PLAN

=================================================================

   

Living Arrangements:  House    (2017 15:11:Maris Tovar RN)

Adequate Access to::  Electric; Heat; Refrigeration; Plumbing/Running

   water; Phone; Transportation    (2017 15:11:Mairs Tovar RN)

WIC Program:  Yes    (2017 15:11:Maris Tovar RN)

Discharge  Person:  Dorian Garcia    (2017 15:11:Maris Tovar RN)

Person to Help after Discharge:  Dorian Garcia    (2017 15:11:Maris Tovar RN)

Currently Using Commun Resources:  No    (2017 15:11:Maris Tovar RN)

Outside Agency/:  No    (2017 15:11:Maris Tvoar RN)

Car Seat for Discharge:  No    (2017 15:11:Maris Tovar RN)

Adoption Requested:  No    (2017 15:11:Maris Tovar RN)

Pt Contact w/infant Post Birth:  N/A    (2017 15:11:Maris Tovar RN)

   

=================================================================

PRENATAL LABS

=================================================================

   

Blood Type:  O Positive    (2017 15:11:Bobbi Irwin RN)

Antibody Screen:  negative    (2017 15:11:Bobbi Irwin RN)

Rho(G) this pregnancy:  Not Applicable    (2017 15:11:Nancy Kimble RN)

Hemoglobin:  11.3 L    (2017 11:50:QS system process)

Hematocrit:  33.7 L    (2017 11:50:QS system process)

MCV:  73 L    (2017 11:50:QS system process)

Group Beta Strep:  unknown    (2017 15:11:Bobbi Irwin RN)

Gonorrhea:  Negative    (2017 15:11:Bobbi Irwin RN)

Chlamydia:  Negative    (2017 15:11:Bobbi Irwin RN)

RPR/VDRL:  Nonreactive    (2017 15:11:Bobbi Irwin RN)

HIV Exposure Test:  Negative    (2017 15:11:Bobbi Irwin RN)

Rubella:  Immune    (2017 15:11:Bobbi Irwin RN)

   

=================================================================

OB/PREVIOUS PREGNANCY HISTORY

=================================================================

   

Current Pregnancy Procedures:  Ultrasound; NST    (2017

   15:11:Maris Tovar RN)

History of Previous :  No    (2017 15:11:Maris Tovar RN)

History of Gestational Diabetes:  No    (2017 15:11:Maris Tovar RN)

History of PIH:  No    (2017 15:11:Maris Tovar RN)

History of Incompetent Cervix:  No    (2017 15:11:Maris Tovar RN)

History of Placenta Previa/Abrup:  No    (2017 15:11:Maris Tovar RN)

History of Macrosomia:  No    (2017 15:11:Maris Tovar RN)

History of IUGR:  No    (2017 15:11:Maris Tovar RN)

History of Postpartum Hemorrhage:  No    (2017 15:11:Maris Tovar RN)

History of Loss/Stillborn:  No    (2017 15:11:Maris Tovar RN)

History of  Death:  No    (2017 15:11:Maris Tovar RN)

History of D (Rh) Sensitization:  No    (2017 15:11:Maris Tovar RN)

History Recurrent Loss/Stillborn:  No    (2017 15:11:Maris Tovar RN)

History Depression/PP Depression:  No    (2017 15:11:Maris Tovar RN)

History of  Uterine Anomaly/CARLOS EDUARDO:  No    (2017 15:11:Maris Tovar RN)

History of Infertility:  No    (2017 15:11:Maris Tovar RN)

History of  ART Treatment:  No    (2017 15:11:Maris Tovar RN)

History of CARLOS EDUARDO:  No    (2017 15:11:Maris Tovar RN)

Comments Obstetrical History:  G1:  female infant 40 w 6 d 9 lb 1 oz 

   G2: current pregnancy IOL for polyhyrdramnios    (2017

   15:11:Clarisse Faith RN)

   

=================================================================

MEDICAL HISTORY

=================================================================

   

Med Hx Diabetes:  No    (2017 15:11:Maris Tovar RN)

Med Hx Hypertension:  No    (2017 15:11:Maris Tovar RN)

Med Hx Heart Disease:  No    (2017 15:11:Maris Tovar RN)

Med Hx Autoimmune Disorder:  No    (2017 15:11:Maris Tovar RN)

Med Hx Kidney Disease/UTI:  No    (2017 15:11:Maris Tovar RN)

Med Hx Neurologic/Epilepsy:  No    (2017 15:11:Maris Tovar RN)

Med Hx Psychiatric Disorders:  No    (2017 15:11:Maris Tovar RN)

Med Hx Hepatitis/Liver Disease:  No    (2017 15:11:Maris Tovar RN)

Med Hx Varicosities/Phlebitis:  No    (2017 15:11:Maris Tovar RN)

Med Hx Thyroid Dysfunction:  Yes (Annotations: Data stored by CPN on

   behalf of user)    (2017 15:11:Rere Fox RN)

Med Hx Blood Transfusion:  No    (2017 15:11:Maris Tovar RN)

Med Hx Pulmonary (Asthma,TB):  No    (2017 15:11:Maris Tovar RN)

Med Hx Breast:  No    (2017 15:11:Maris Tovar RN)

Med Hx GYN Surgery:  No    (2017 15:11:Maris Tovar RN)

Med Hx Hospitalization/Surgery:  Yes    (2017 15:11:Maris Tovar RN)

Med Hx Anesthetic Complications:  No    (2017 15:11:Maris Tovar RN)

Med Hx Abnormal Pap Smear:  No    (2017 15:11:Maris Tovar RN)

Med Hx Significant Family Hx:  No    (2017 15:11:Maris Tovar RN)

Details of Med/Surg Hx:  appendectomy unsure of what year

   hypothyroid on synthroid     (2017 15:11:Rere Fox RN)

   

=================================================================

INFECTIOUS HISTORY

=================================================================

   

Inf Hx Gonorrhea:  No    (2017 15:11:Maris Tovar RN)

Inf Hx Chlamydia:  No    (2017 15:11:Maris Tovar RN)

Inf Hx Syphilis:  No    (2017 15:11:Maris Tovar RN)

Inf Hx HIV/AIDS:  No    (2017 15:11:Maris Tovar RN)

Inf Hx Human Papilloma Virus:  No    (2017 15:11:Maris Tovar RN)

Inf Hx Pt/Partner Genital Herpes:  No    (2017 15:11:Maris Tovar RN)

Inf Hx Tuberculosis/Exposure:  No    (2017 15:11:Maris Tovar RN)

Inf Hx Hepatitis B,C:  No    (2017 15:11:Maris Tovar RN)

Inf Hx Rash or Viral Illness:  No    (2017 15:11:Maris Tovar RN)

   

=================================================================

GENETIC HISTORY

=================================================================

   

Gen Hx Age >=35 at JAKE:  No    (2017 15:11:Maris Tovar RN)

Gen Hx Thalassemia:  No    (2017 15:11:Maris Tovar RN)

Gen Hx Congenital Heart Defect:  No    (2017 15:11:Maris Tovar RN)

Gen Hx Neural Tube Defect:  No    (2017 15:11:Maris Tovar RN)

Gen Hx Down's Syndrome:  No    (2017 15:11:Maris Tovar RN)

Gen Hx Hussein-Sachs:  No    (2017 15:11:Maris Tovar RN)

Gen Hx Canavan:  No    (2017 15:11:Maris Tovar RN)

Gen Hx Familial Dysautonomia:  No    (2017 15:11:Maris Tovar RN)

Gen Hx Sickle Cell Disease/Trait:  No    (2017 15:11:Maris Tovar RN)

Gen Hx Hemophilia/Blood Disorder:  No    (2017 15:11:Maris Tovar RN)

Gen Hx Muscular Dystrophy:  No    (2017 15:11:Maris Tovar RN)

Gen Hx Cystic Fibrosis:  No    (2017 15:11:Maris Tovar RN)

Gen Hx Huntingtons Chorea:  No    (2017 15:11:Maris Tovar RN)

Gen Hx Mental Retardation/Autism:  No    (2017 15:11:Maris Tovar RN)

Gen Hx Tested for Fragile X:  No    (2017 15:11:Maris Tovar RN)

Gen Hx Other Inher/Chromosomal:  No    (2017 15:11:Maris Tovar RN)

Gen Hx Maternal Metabolic DO:  No    (2017 15:11:Maris Tovar RN)

Gen Hx Pt Father or FOB Defect:  No    (2017 15:11:Maris Tovar RN)

Gen Hx Other Genetic History:  No    (2017 15:11:Maris Tovar RN)

Gen Hx Drugs/Meds since LMP:  No    (2017 15:11:Maris Tovar RN)

## 2017-03-30 NOTE — L&D CARE PLAN
=================================================================

LD CARE PLANS

=================================================================

Datetime Report Generated by CPN: 2017 06:15

   

   

=================================================================

Datetime: 2017 11:26

=================================================================

   

   

=================================================================

Pain

=================================================================

   

 State:  Risk For (Jeanine Camp, RNC)

 Related To:  Labor and Delivery Process; Treatment and Procedures

   (Jeanine Camp, RNC)

 Goal(s):  Patients Pain will be Assessed and Managed; Patient will

   Verbalize Adequate Relief of Pain or the Ability to [place] with

   Current Pain (Jeanine Camp, RNC)

 Interventions:  Assess Pain Severity on Scale of 0 (None) to 5

   (Severe); Assess Type, Location and Intensity of Pain Each Time

   Client Reports Discomfort and Notify Provider if Unusal Pain

   Develops; Encourage Proper Breathing and Relaxation Techniques;

   Offer Alternatives Such as Repositioning, Calm Environment,

   Massages, Diversional Activities, Ice Pack, Splinting, and

   Ambulation; Administer Analgesics as Ordered; Assist with Epidural

   Placement as Appropriate; Evaluate Therapeutic Effectiveness of

   Medication and Treatments (Jeanine Rai, RADHA)

 Outcome:  Patient will Report Absence or Relief of Pain Consistent

   with Established Pain Goal (RADHA Mobley)

   Status:  Ongoing (RADHA Mobley)

 Outcome:  Patient will have a Decrease in Signs and Symptoms of

   Discomfort (RADHA Mobley)

   Status:  Ongoing (RADHA Mobley)

 Outcome:  Pain will be Controlled During Procedures (RADHA Mobley)

   Status:  Ongoing (RADHA Mobley)

   

=================================================================

Anxiety

=================================================================

   

 State:  Actual (RADHA Mobley)

 Related To:  Labor and Delivery Process; Fear of Unknown; Situational

   Crisis; Medical Interventions; Significant Life Event (RADHA Mobley)

 Goal(s):  Patient will have Decreased Anxiety and be able to Function

   at Acceptable Levels (RADHA Mobley)

 Interventions:  Assess Verbal and Nonverbal  Behavioral Indicators of

   Anxiety; Assist Patient to Identify and Verbalize Symptoms of

   Anxiety; Identify and Demonstrate Techniques to Control Anxiety;

   Assist Patient with Coping Mechanisms to Manage Anxiety; Provide

   Theraputic Touch for the Patient; Explain to Patient, Using a Calm

   Reassuring Approach and Nonmedical Terms, All Activities,

   Procedures, and Concerns; Instruct Patient and Family about Post

   Discharge Care, Limitations, Symptoms to Report and Resources

   Available (RADHA Mobley)

 Outcome:  Patient will Identify, Verbalize and Demonstrate Techniques

   to Control Anxiety (RADHA Mobley)

   Status:  Ongoing (Jeanine Rai RNC)

 Outcome:  Patient's Posture, Facial Expressions, Gestures and Activity

   Level will Reflect Decreased Anxiety (RADHA Mobley)

   Status:  Ongoing (Jeanine Rai, RN)

 Outcome:  Patient will Verbalize a Sense of Control and/or Acceptance

   of the Situation (Jeanine Rai, RNC)

   Status:  Ongoing (Jeanine Rai, RN)

 Outcome:  Patient will Identify and Utilize Support Person (Jeanine Rai RN)

   Status:  Ongoing (Jeanine Rai [facility])

   

=================================================================

Knowledge Deficit

=================================================================

   

 State:  Actual (RADHA Mobley)

 Related To:  Labor and Delivery Process; Treatment and Procedures;

   Feeding and Infant Care; Community Resources and Available Support

   Mechanisms (RADHA Mobley)

 Goal(s):  Patient will Accurately Verbalize Understanding of Plan of

   Care and Treatment; Patient and Family will Accurately Verbalize

   Understanding of the Disease Process (RADHA Mobley)

 Interventions:  Assess Motivation and Willingness of Patient/Family to

   Learn; Assess Preferred Learning Mode: One to One Instruction,

   Reading, Videos, Group Discussion or Demonstration; Assess Barriers

   to Learning: Pain, Emotional State, Language Barrier, Cognitive

   Impairment, Visual or Hearing Deficits; Assess Patient and Family

   Knowledge of Disease Process, Medications and Treatment; Discuss

   Therapy and/or Treatment Options, Describe Rationale Behind

   Management, Therapy and Treatment Recommendations; Instruct Patient

   and Family on Signs and Symptoms to Report; Instruct Patient and

   Family on Medication Effects and Side Effects; Provide Appropriate

   and Timely Education Using Multiple Techniques; Provide Patient and

   Family with Support Group Information and Resources; Give Clear and

   Thorough Explanations and Demonstrations (RADHA Mobley)

 Outcome:  Patient and Family will Verbalize Understanding of

   Condition, Treatment and Signs and Symptoms to Report (RADHA Mobley)

   Status:  Ongoing (Jeanine Rai RN)

 Outcome:  Patient will Identify Perceived Learning Needs and Express

   Motivation to Learn (Jeanine Union, [facility])

   [facility]  [facility]

 Outcome:  Patient will Verbalize Understanding of Desired Content,

   and/or Performs Desired Skill Prior to Discharge (Jeanine Union, [facility])

   Status:  Ongoing ([facility], [facility])

   

=================================================================

Infection

=================================================================

   

 State:  Risk For (Jeanine Rai, [facility])

 Related To:  Prolonged Labor or Induction; Invasive Procedures (Jeanine Rai, [facility])

 Goal(s):  The Patient will be Free of Infection, Vital Signs Stable

   and Lab Work within Normal Parameters (Jeanine Union, [facility])

 Interventions:  Instruct and Reinforce Proper Handwashing, Hygiene,

   and  Care Techniques to Patient and Family; Monitor Vital

   Signs; Monitor Patient for the Following Signs of Infection: Fever,

   Abdominal Tenderness, Unusual Discharge; Monitor Aminiotic Fluid,

   Urine and Lochia for Color and Odor; Observe Wounds, Incisions and

   Invasive Line Sites for Redness, Drainage and Edema; Assess IV Sites

   per Hospital Policy; Monitor Lab and Test Results and Notify

   Provider of Abnormal Findings; Assess Nutritional Status and Promote

   Good Nutrition (Jeanine Rai, [facility])

 Outcome:  Patient will Remain Free of Infection (Jeanine Union, RN)

   Status:  [facility]

 Outcome:  Infection will be Recognized Early to Allow for Prompt

   Treatment (Jeanine aRi, [facility])

   Status:  Ongoing [facility]

 Outcome:  Patient will have Vital Signs Within Expected Range ([facility], RN)

   Status:  Ongoing ([facility], [facility])

   

=================================================================

Fluid Volume

=================================================================

   

 State:  Risk For (Jeanine Camp, RNC)

 Related To:  Prolonged Labor or Induction (Jeanine Camp, RNC)

 Goal(s):  Patient will Achieve and Maintain a Balanced Fluid Volume

   Status; Hemodynamically Stable (Jeanine Camp, RNC)

 Interventions:  Monitor Vital Signs; Auscultate Breath Sounds; Monitor

   Patient for Skin Turgor, Mucous Membranes, Dry Skin, Weakness,

   Headaches and Confusion; Provide Oral Fluids as Ordered; Initiate

   and Maintain Intravenous Fluids as Ordered; Monitor Intake and

   Output as Indicated Per Patient Status; Accurately Measure Blood

   Loss; Monitor Lab and Test Results as Obtained and Notify Provider

   of Abnormal Findings; Monitor Patient's Weight (Jeanine Camp, RNC)

 Outcome:  Patient will have Clear Lung Sounds (Jeanine Camp, RNC)

   Status:  Ongoing (Jeanine Camp, RNC)

 Outcome:  Patient will have Vital Signs within Expected Range (Jeanine

   Camp, RNC)

   Status:  Ongoing (Jeanine Camp, RNC)

 Outcome:  Urine Output will be within Expected Range (Jeanine Camp, RNC)

   Status:  Ongoing (Jeanine Camp, RNC)

 Outcome:  Patient will have Minimal Generalized or Upper Extremity

   Edema (Jeanine Camp, RNC)

   Status:  Ongoing (Jeanine Camp, RNC)

   

=================================================================

Injury

=================================================================

   

 State:  Risk For (Jeanine Camp, RNC)

 Related To:  Labor and Delivery Process (Jeanine Camp, RNC)

 Goal(s):  Patient will Remain Free from Injury (RADHA Mobley)

 Interventions:  Fetal Monitoring as per Hospital Protocol; Assess

   Neurological Status; Perform Risk Assessment of Patients with

   Induction and ; Perform Fall Risk Assessment and Prevention per

   Hospital Protocol; Perform DVT Risk Assessment and Prophylaxis per

   Hospital Protocol; Ensure that Oxygen, Suction, and Resuscitation

   Medications and Equipment are Readily Available; Confirm Patient ID

   Prior to Procedure(s) and Medication Administration per Hospital

   Policy (RADHA Mobley)

 Outcome:  Successful Fall Risk Prevention (RADHA Mobley)

   Status:  Ongoing (RADHA Mobley)

 Outcome:  Patient will Deliver Infant without Adverse Sequela (RADHA Mobley)

   Status:  Ongoing (RADHA Mobley)

 Outcome:  Patient's Neurological Status will Remain Stable (RADHA Mobley)

   Status:  Ongoing (RADHA Mobley)

   

=================================================================

Impaired Skin Integrity

=================================================================

   

 State:  Risk For (RADHA Mobley)

 Related To:  Vaginal Delivery; Invasive Procedures (RADHA Mobley)

 Goal(s):  Patient will Maintain Optimal Skin Integrity, Free of

   Breakdown, Injury or Infection (RADHA Mobley)

 Interventions:  Complete Screening for Pressure Ulcer Risk and

   Initiate Protocol per Hospital Policy; Monitor Site of Skin

   Impairment for Color Changes, Redness, Swelling, Warmth, Pain or

   Other Signs of  Infection; Encourage and Assist with Position

   Changes; Monitor Patient's Mobility Status; Provide Adequate

   Nutrition and Fluids; Teach Patient Appropriate Hygienic Care; Teach

   Patient/Family Skin Care Management (RADHA Mobley)

 Outcome:  Patient will not have Evidence of Injury Such as Skin

   Breakdown, Scrapes, Cuts, or Bruising (RADHA Mobley)

   Status:  Ongoing (RADHA Mobley)

 Outcome:  Patient will Report Any Altered Sensation or Pain at Site of

   Skin Impairment (RADHA Mobley)

   Status:  Ongoing (RADHA Moblye)

 Outcome:  Patients Incisions and Wounds will be without Signs or

   Symptoms of Infection (RADHA Mobley)

   Status:  Ongoing (RADHA Mobley)

 Outcome:  Patient will Demonstrate Understanding of Plan to Heal Skin

   and Prevent Reinjury and Verbalize Risk Factors (RADHA Mobley)

   Status:  Ongoing (RADHA Mobley)

   

=================================================================

Nutrition

=================================================================

   

 State:  Risk For (RADHA Mobley)

 Related To:  Pregnancy; Breastfeeding (RADHA Mobley)

 Goal(s):  Patient will have an Intake of Nutrients Sufficient to Meet

   Metabolic Needs (RADHA Mobley)

 Interventions:  Nutritional Screening and Assessment per Hospital

   Policy; Consult Dietician for Further Assessment and Recommendations

   Regarding Food Preferences and Nutritional Support; Allow Patient to

   Plan and Order Diet when Possible; Monitor Laboratory Values That

   Indicate Nutritional Well-being; Consult Lactation Specialist for

   Nutritional Support Regarding Breastfeeding Requirements; Document

   Actual Weight Initially and Weekly (Do Not Estimate); Encourage

   Patient Participation in Maintaining a Food Log as Indicated;

   Educate Patient on the Importance of Maintaining an Adequate Caloric

   Intake (RADHA Mobley)

 Outcome:  Patient will Receive Adequate Calories and Fluid Volume to

   Meet Metabolic Needs (RADHA Mobley)

   Status:  Ongoing (RADHA Mobley)

 Outcome:  Patient will Select Foods or Meals that Support Adequate

   Nutrition (RADHA Mobley)

   Status:  Ongoing (RADHA Mobley)

## 2017-03-30 NOTE — PDOC PROGRESS REPORT
Subjective-OB


Subjective: 


Post Delivery Day:








21 year old.  Denies any needs at this time 








Physical Exam (OB)


Vital Signs: 


 











Temp Pulse Resp BP Pulse Ox


 


 97.8 F   65   15   114/62   100 


 


 03/30/17 07:55  03/30/17 07:55  03/30/17 07:55  03/30/17 07:55  03/30/17 07:55








 Intake & Output











 03/29/17 03/30/17 03/31/17





 06:59 06:59 06:59


 


Intake Total   400


 


Balance   400


 


Weight  106.05 kg 














- Lochia


Lochia Amount: Scant < 10 ml


Lochia Color: Rubra/Red





- Abdomen


Description: Soft, Round


Hernia Present: No


Bowel Sounds: Normoactive


Flatus Presence: Present


Stool: No


Fundal Description: Firm, Midline


Fundal Height: u/u - u/2





Objective-Diagnostic


Laboratory: 


 





 03/30/17 07:46 





 











  03/29/17 03/29/17 03/29/17





  11:25 11:50 11:50


 


WBC   8.0 


 


RBC   4.62 


 


Hgb   11.3 L 


 


Hct   33.7 L 


 


MCV   73 L 


 


MCH   24.3 L 


 


MCHC   33.4 


 


RDW   15.5 H 


 


Plt Count   147 L 


 


Seg Neutrophils %   76.7 


 


Lymphocytes %   12.9 L 


 


Monocytes %   9.6 


 


Eosinophils %   0.5 


 


Basophils %   0.3 


 


Absolute Neutrophils   6.1 


 


Absolute Lymphocytes   1.0 


 


Absolute Monocytes   0.8 


 


Absolute Eosinophils   0.0 


 


Absolute Basophils   0.0 


 


Urine Color  CHRISTOPHER  


 


Urine Appearance  CLOUDY  


 


Urine pH  6.0  


 


Ur Specific Gravity  1.016  


 


Urine Protein  30 H  


 


Urine Glucose (UA)  >=500 H  


 


Urine Ketones  NEGATIVE  


 


Urine Blood  NEGATIVE  


 


Urine Nitrite  NEGATIVE  


 


Ur Leukocyte Esterase  LARGE H  


 


Blood Type    O POSITIVE


 


Antibody Screen    NEGATIVE














  03/30/17





  07:46


 


WBC  10.2


 


RBC  4.33


 


Hgb  10.6 L


 


Hct  31.7 L


 


MCV  73 L


 


MCH  24.4 L


 


MCHC  33.3


 


RDW  16.0 H


 


Plt Count  133 L


 


Seg Neutrophils % 


 


Lymphocytes % 


 


Monocytes % 


 


Eosinophils % 


 


Basophils % 


 


Absolute Neutrophils 


 


Absolute Lymphocytes 


 


Absolute Monocytes 


 


Absolute Eosinophils 


 


Absolute Basophils 


 


Urine Color 


 


Urine Appearance 


 


Urine pH 


 


Ur Specific Gravity 


 


Urine Protein 


 


Urine Glucose (UA) 


 


Urine Ketones 


 


Urine Blood 


 


Urine Nitrite 


 


Ur Leukocyte Esterase 


 


Blood Type 


 


Antibody Screen

## 2017-03-31 NOTE — PDOC DISCHARGE SUMMARY
Final Diagnosis


Discharge Date: 03/31/17





- Final Diagnosis


(1) Delivery normal


Is this a current diagnosis for this admission?: Yes





(2) Shoulder (girdle) dystocia during labor and delivery, delivered


Is this a current diagnosis for this admission?: Yes





(3) Acute blood loss anemia


Is this a current diagnosis for this admission?: Yes








Discharge Data





- Discharge Medication


Home Medications: 








Levothyroxine Sodium [Synthroid 0.05 mg Tablet] 1 tab .ROUTE DAILY 02/06/17 


Prenatal Vit #76/Iron,Carb/FA [Pnv 29-1 Tablet] 1 tab PO DAILY 02/06/17 








Gestational Age: 39.2


Reason(s) for Admission: Induction of Labor


Prenatal Procedures: NST


Intrapartum Procedure(s): Spontaneous Vaginal Delivery


Postpartum Complication(s) Note: 


shoulder dystocia





- Diagnosis Test


Laboratory: 


 











Temp Pulse Resp BP Pulse Ox


 


 98.1 F   67   14   118/54 L  100 


 


 03/31/17 08:50  03/31/17 08:50  03/31/17 08:50  03/31/17 08:50  03/31/17 08:50








 











  03/29/17 03/29/17 03/30/17





  11:25 11:50 07:46


 


RBC   4.62  4.33


 


Hgb   11.3 L  10.6 L


 


Hct   33.7 L  31.7 L


 


Urine Opiates Screen  NEGATIVE  














- Discharge information/Instructions


Discharge Activity: Activity As Tolerated, No Lifting Over 10 Pounds, Pelvic 

Rest, No tub bath


Discharge Diet: Regular


Disposition: HOME, SELF-CARE


Follow up with: [facility]


in: 4, Weeks

## 2017-06-11 NOTE — RADIOLOGY REPORT (SQ)
EXAM DESCRIPTION:  U/S ABDOMEN LTD W/DOPPLER



COMPLETED DATE/TIME:  6/11/2017 8:42 am



REASON FOR STUDY:  ruq pain



COMPARISON:  None.



TECHNIQUE:  Dynamic and static grayscale images acquired of the abdomen and recorded on PACS. Additio
nal selected color Doppler and spectral images recorded.



LIMITATIONS:  None.



FINDINGS:  PANCREAS: No masses. Visualized pancreatic duct normal caliber.

LIVER: No masses. Echotexture normal.

LIVER VASCULATURE: Normal directional flow of the main portal vein and hepatic veins.

GALLBLADDER: Wall echo shadow sign compatible with gallbladder filled with stones.  No thickening of 
gallbladder wall.  No pericholecystic fluid collection.

ULTRASOUND-DETECTED VELA'S SIGN: Negative.

INTRAHEPATIC DUCTS AND COMMON DUCT: CBD and intrahepatic ducts normal caliber. No filling defects.

INFERIOR VENA CAVA: Normal flow.

AORTA: No aneurysm.

RIGHT KIDNEY:  Normal size. Normal echogenicity. No solid or suspicious masses. No hydronephrosis. No
 calcifications.

PERITONEAL AND RIGHT PLEURAL SPACE: No ascites or effusions.

OTHER: No other significant finding.



IMPRESSION:  GALLSTONES.  OTHERWISE NORMAL RIGHT UPPER QUADRANT ULTRASOUND.



TECHNICAL DOCUMENTATION:  JOB ID:  [id number]

 2011 [employer]- All Rights Reserved

## 2017-06-11 NOTE — ER DOCUMENT REPORT
ED General





- General


Chief Complaint: Abdominal Pain


Stated Complaint: UPPER ABDOMINAL PAIN SPREADS TO BACK


Time Seen by Provider: 06/11/17 07:45


TRAVEL OUTSIDE OF THE [place] IN LAST 30 DAYS: No





- HPI


Patient complains to provider of: Right upper quadrant abdominal pain


Notes: 


Patient states woke up this morning with epigastric right upper quadrant 

abdominal pain.  Patient denies any fevers chills vomiting diarrhea patient 

states she is mildly nauseous.  Patient states that prior to arrival was eating 

hot dogs and hamburgers patient denies previous history of this pain.  Denies 

any recent antibiotics or travel.





- Related Data


Allergies/Adverse Reactions: 


 





No Known Allergies Allergy (Verified 06/11/17 08:25)


 











Past Medical History





- Social History


Smoking Status: Unknown if Ever Smoked


Family History: Reviewed & Not Pertinent


Patient has suicidal ideation: No


Patient has homicidal ideation: No


Renal/ Medical History: Denies: Hx Peritoneal Dialysis


Past Surgical History: Reports: Hx Appendectomy





- Immunizations


Hx Diphtheria, Pertussis, Tetanus Vaccination: Yes





Review of Systems





- Review of Systems


Gastrointestinal: Abdominal pain





Physical Exam





- Vital signs


Vitals: 


 











Temp Pulse Resp BP Pulse Ox


 


 97.7 F   68   16   111/46 L  98 


 


 06/11/17 06:56  06/11/17 06:56  06/11/17 06:56  06/11/17 06:56  06/11/17 06:56











Interpretation: Normal





- General


General appearance: Appears well, Alert





- HEENT


Head: Normocephalic, Atraumatic


Eyes: Normal


Pupils: PERRL





- Respiratory


Respiratory status: No respiratory distress


Chest status: Nontender


Breath sounds: Normal


Chest palpation: Normal





- Cardiovascular


Rhythm: Regular


Heart sounds: Normal auscultation


Murmur: No





- Abdominal


Inspection: Normal


Distension: No distension


Bowel sounds: Normal


Tenderness: Tender


Organomegaly: No organomegaly





- Back


Back: Normal, Nontender





- Extremities


General upper extremity: Normal inspection, Nontender, Normal color, Normal ROM

, Normal temperature


General lower extremity: Normal inspection, Nontender, Normal color, Normal ROM

, Normal temperature, Normal weight bearing.  No: Erma's sign





- Neurological


Neuro grossly intact: Yes


Cognition: Normal


Orientation: AAOx4


[place] Coma Scale Eye Opening: Spontaneous


[place] Coma Scale Verbal: Oriented


[place] Coma Scale Motor: Obeys Commands


[place] Coma Scale Total: 15


Speech: Normal


Motor strength normal: LUE, RUE, LLE, RLE


Sensory: Normal





- Psychological


Associated symptoms: Normal affect, Normal mood





- Skin


Skin Temperature: Warm


Skin Moisture: Dry


Skin Color: Normal





Course





- Re-evaluation


Re-evalutation: 





06/11/17 14:53


The patient presents with abdominal pain without signs of peritonitis or other 

life-threatening or serious etiology. The patient appears stable for discharge 

and has been instructed to return immediately if the symptoms worsen in any way

, or in 8-12hr if not improved for re-evaluation. The patient has been 

instructed to return if the symptoms worsen or change in any way.  Patient's 

ultrasound does show gallstones no signs of obstruction or infection at this 

time.  Patient was educated about diet control patient will be discharged home 

follow-up with local surgeons





- Vital Signs


Vital signs: 


 











Temp Pulse Resp BP Pulse Ox


 


 97.6 F   49 L  12   92/43 L  97 


 


 06/11/17 10:28  06/11/17 10:28  06/11/17 10:28  06/11/17 10:28  06/11/17 10:28














- Laboratory


Result Diagrams: 


 06/11/17 08:10





 06/11/17 08:10


Laboratory results interpreted by me: 


 











  06/11/17 06/11/17





  08:10 09:09


 


MCV  77 L 


 


MCH  24.6 L 


 


RDW  16.9 H 


 


Urine Glucose (UA)   50 H


 


Urine Urobilinogen   2.0 H














Discharge





- Discharge


Clinical Impression: 


 Gallstones, Right upper quadrant abdominal pain





Condition: Good


Disposition: HOME, SELF-CARE


Instructions:  Abdominal Pain ([facility]), Low-Fat Diet ([facility]), Oral Narcotic 

Medication ([facility])


Additional Instructions: 


Your ultrasound today showed that her gallbladder is full of gallstones.  I 

would highly recommend that you watch her diet and avoid foods that are full of 

fat grease or old.  If he continued to have problems you may follow-up with 

surgeons provided for possible surgical removal of the gallbladder.  Most the 

time this can be prevented by dietary control.  Please return to the ER if you 

develop pain and fever or yellowing of her skin


Prescriptions: 


Ondansetron [Zofran Odt 4 mg Tablet] 4 mg PO Q4HP PRN #30 tab.rapdis


 PRN Reason: 


Hydrocodone Bit/Acetaminophen [Hydrocodon-Acetaminophen 5-325] 1 each PO Q6 #30 

tablet


Forms:  Return to Work


Referrals: 


FREDERIC YANES MD [ACTIVE STAFF] - Follow up as needed

## 2017-07-14 NOTE — OPERATIVE REPORT
Operative Report


DATE OF SURGERY: 07/14/17


PREOPERATIVE DIAGNOSIS: Symptomatic cholelithiasis with cholecystitis


POSTOPERATIVE DIAGNOSIS: Same


OPERATION: Laparoscopic cholecystectomy.  Manipulation of retained stones in 

cystic duct


SURGEON: FREDERIC YANES


ANESTHESIA: GA


TISSUE REMOVED OR ALTERED: Gallbladder and gallstones


COMPLICATIONS: 


None


ESTIMATED BLOOD LOSS: Scant


INTRAOPERATIVE FINDINGS: See below


PROCEDURE: 


The patient was taken to the preop holding area the main operating room where 

general anesthesia was induced.  Arms abducted abdomen exposed.  Was prepped 

and draped in sterile fashion and instrumentation was set up for laparoscopic 

cholecystectomy.





Surgical plan and surgical timeout were conducted.





Skin was anesthetized with quarter percent plain Marcaine.  A curvilinear 

incision was made over the umbilicus.  An attempt was made to introduce the 

hemostats into the peritoneal cavity but was unsuccessful due to abdominal wall 

musculature  away from the peritoneum.





Therefore a right upper quadrant incision was made with the 15 blade, Veress 

needle inserted the peritoneal cavity, and pneumoperitoneum was established.  

Veress needle was removed, 5 mm port was inserted and a 5 mm flexible scope was 

inserted.  We visualized the supraumbilical area and there was no evidence of 

peritoneal penetration from the hemostats previously manipulated.  Therefore a 

5 mm ports inserted at the supraumbilical site.





Under direct visualization 2 additional ports placed one subxiphoid 1 in the 

subcostal position.





Findings are significant for moderately distended gallbladder.  It was grasped 

in the fundus and infundibulum and reflected up of the liver bed.  We dissected 

out the cystic duct and the cystic artery which were both in the classic 

location.  The triangle of calot was opened and visualized and photographed.  

During the manipulation of the neck of the gallbladder, a hole was made in the 

gallbladder and there was release of several very small faceted stones.  For 

this reason I elected to put a clip on the gallbladder side of the cystic duct, 

and open the cystic duct with scissors.  This released a fair amount of sludge.

  Therefore I divided the cystic duct in its entirety, and left the stump open 

to allow egress of sludge and eventually bile





The cystic artery was clipped twice proximally once distally and divided.  The 

gallbladder was removed from the liver bed using hook cautery dissection placed 

in Endobag by the patient to the supraumbilical port site incision without 

spillage of stones.  Specimen was passed off to pathology





We returned the peritoneal cavity, and investigated the cystic duct stump.  I 

continue to milk a fair amount of sludge out of the duct stump using blunt 

dissection.  Eventually we got clear bile from the cystic duct stump, and I 

felt that we had sufficiently evacuated the contents of the residual cystic 

duct.  The cystic duct stump was secured with a single Endoloop 3-0 PDS.  

Conclusion photos were taken.





We level the patient had irrigated peritoneal cavity, check for bleeding there 

was none.  We were satisfied with the position of the clip on the cystic artery 

stump and the Endoloop on the cystic duct stump.





Sponge and counts correct.  All ports removed under direct visualization 

pneumoperitoneum evacuated, wounds closed with 0 Vicryl 3-0 Vicryl benzoin Steri

-Strips.





Patient tolerated  procedure well, extubated, and taken recovery in stable 

condition.

## 2017-07-14 NOTE — PDOC DISCHARGE SUMMARY
Discharge Summary (SDC)





- Discharge


Final Diagnosis: 


cholecystitis


Date of Surgery: 07/14/17


Discharge Date: 07/14/17


Condition: Stable


Treatment or Instructions: 








 [facility]


 [address], [place] [address]


 Phone: [phone]    Fax:  [phone]


 





 Discharge Instructions: Laparoscopic Surgery





1. General Information: 


a.   DO NOT DRIVE a car or operate dangerous machinery for 3-4 days or while 

taking narcotic pain pills.


b.   DO NOT consume alcohol, tranquilizers, sleeping medications or any non-

prescribed medications for 24 hours unless approved by your doctor or as long 

as taking narcotic prescription medications.


c.   DO NOT make important decisions or sign any important papers for the first 

24 hours after surgery.


d.   When discharged home the same day of surgery have a responsible person 

with you for the first night.


2. Activity Restrictions: __________ 2 weeks 


a.   NO heavy lifting, straining abdominal muscles, bending over a lot, yard 

work, house work, or sports for 2 weeks.


b.   DO NOT drive for 3-4 days or while taking _Tylenol #3__ . 


c.   It is fine to go for walks, up and down steps, ride in a car. 


d.   Elevate your head when sleeping/resting.


3. Treatment: 


a.   You may shower 24 hours after surgery, no baths or swimming for 2 weeks. 

Remove band-aids or dressings before shower but leave paper strips (steri-strips

) on the skin to fall off on their own. If still on at postoperative visit they 

will be removed then.  


b.   Drainage of fluid or blood is not unusual from an incision. If occurs, you 

can clean with peroxide and cotton ball daily and cover with dry gauze until 

the wound seals. 


c.   If a lot of bleeding occurs, you can hold pressure with a gauze or cloth 

over the site for 10 minutes and it will usually stop. If bleeding continues 

you will need to call for possible evaluation in office or emergency room.


4. Medications: 


a.   _Tylenol #3___ may be taken for pain as needed, one  tablet every 6 hours. 

Stop the narcotic when able since you cannot take it and drive, and they cause 

constipation. You may switch to plain  Advil or Aleve as you transition from 

the narcotic. Many adults find good pain relief with Advil 600-800 mg three 

times a day with meals. This can cause indigestion, ulcers, and kidney problems 

with long-term use. 


b.   You should resume all normal medications unless a change is specified by 

your doctors.





5. Diet: 


                _____ Begin with clear liquids and may progress to your normal 

diet if not nauseated. No high fat, high protein foods 


                         the day of surgery. 





6. The following may occur after laparoscopic surgery:


a.   Shoulder or upper back ache from retained gas that should resolve in 1-2 

days


b.   Soreness and bruising at incision sites will resolve with time.


c.   Scrotal swelling (labia in women) and bruising is often seen after hernia 

surgery.


d.   Sore throat


e.   Fatigue may last days to weeks.


f.   Difficulty urinating may occur and may need to come into emergency room 

for urinary catheter placement.


7. Notify Physician If:


a.   Worsening or pain not improved with pain medication


b.   Persistent nausea and vomiting


c.   Fever above 101


d.   Persistent bleeding or swelling at operative site


e.   Unable to urinate and uncomfortable bladder 6-8 hours after surgery


8..Follow Up Care:


a.   Schedule a follow up appointment with your doctor for 2 weeks. In the 

event of any postoperative problems or questions or you may call the office 

during business hours or the On-Call physician evenings and weekends at [facility].   [facility] [phone]     [facility] [phone]





   I understand the instructions for my postoperative care as described above 

and a copy has been given to me.





   _________________________          _________________________          _______

________


   Patient/Significant Other                    Witness                        

                       Date


Prescriptions: 


Acetaminophen with Codeine [Tylenol #3 Tablet] 1 each PO Q6HP PRN #20 tablet


 PRN Reason: 


Respiratory Treatments at Home: Deep Breathing/Coughing


Discharge Activity: Activity As Tolerated


Report the Following to Your Physician Immediately: Nausea, Vomiting, Fever 

over 101 Degrees, Swelling, Warmth, Drainage-Foul Smelling